# Patient Record
Sex: FEMALE | Race: WHITE | NOT HISPANIC OR LATINO | Employment: FULL TIME | URBAN - METROPOLITAN AREA
[De-identification: names, ages, dates, MRNs, and addresses within clinical notes are randomized per-mention and may not be internally consistent; named-entity substitution may affect disease eponyms.]

---

## 2017-03-26 ENCOUNTER — HOSPITAL ENCOUNTER (EMERGENCY)
Facility: CLINIC | Age: 37
Discharge: HOME | End: 2017-03-26
Attending: FAMILY MEDICINE | Admitting: FAMILY MEDICINE
Payer: COMMERCIAL

## 2017-03-26 ENCOUNTER — APPOINTMENT (OUTPATIENT)
Dept: GENERAL RADIOLOGY | Facility: CLINIC | Age: 37
End: 2017-03-26
Attending: FAMILY MEDICINE
Payer: COMMERCIAL

## 2017-03-26 VITALS
OXYGEN SATURATION: 100 % | HEART RATE: 97 BPM | DIASTOLIC BLOOD PRESSURE: 82 MMHG | RESPIRATION RATE: 18 BRPM | TEMPERATURE: 98.5 F | SYSTOLIC BLOOD PRESSURE: 129 MMHG

## 2017-03-26 DIAGNOSIS — R30.0 DYSURIA: ICD-10-CM

## 2017-03-26 DIAGNOSIS — S92.534A CLOSED NONDISPLACED FRACTURE OF DISTAL PHALANX OF LESSER TOE OF RIGHT FOOT, INITIAL ENCOUNTER: ICD-10-CM

## 2017-03-26 LAB
ALBUMIN UR-MCNC: 30 MG/DL
APPEARANCE UR: ABNORMAL
BILIRUB UR QL STRIP: NEGATIVE
COLOR UR AUTO: YELLOW
GLUCOSE UR STRIP-MCNC: NEGATIVE MG/DL
HCG UR QL: NEGATIVE
HGB UR QL STRIP: NEGATIVE
KETONES UR STRIP-MCNC: 10 MG/DL
LEUKOCYTE ESTERASE UR QL STRIP: NEGATIVE
MUCOUS THREADS #/AREA URNS LPF: PRESENT /LPF
NITRATE UR QL: NEGATIVE
PH UR STRIP: 6 PH (ref 5–7)
RBC #/AREA URNS AUTO: 1 /HPF (ref 0–2)
SP GR UR STRIP: 1.02 (ref 1–1.03)
URN SPEC COLLECT METH UR: ABNORMAL
UROBILINOGEN UR STRIP-MCNC: 2 MG/DL (ref 0–2)
WBC #/AREA URNS AUTO: 3 /HPF (ref 0–2)

## 2017-03-26 PROCEDURE — 99214 OFFICE O/P EST MOD 30 MIN: CPT

## 2017-03-26 PROCEDURE — 99213 OFFICE O/P EST LOW 20 MIN: CPT | Performed by: FAMILY MEDICINE

## 2017-03-26 PROCEDURE — 81025 URINE PREGNANCY TEST: CPT | Performed by: FAMILY MEDICINE

## 2017-03-26 PROCEDURE — 73630 X-RAY EXAM OF FOOT: CPT | Mod: RT

## 2017-03-26 PROCEDURE — 81001 URINALYSIS AUTO W/SCOPE: CPT | Performed by: FAMILY MEDICINE

## 2017-03-26 RX ORDER — VENLAFAXINE HYDROCHLORIDE 150 MG/1
150 CAPSULE, EXTENDED RELEASE ORAL
COMMUNITY
Start: 2016-05-16

## 2017-03-26 RX ORDER — DESOXIMETASONE 0.5 MG/G
GEL TOPICAL
COMMUNITY
Start: 2013-06-19 | End: 2018-11-05

## 2017-03-26 RX ORDER — CHLORAL HYDRATE 500 MG
CAPSULE ORAL
COMMUNITY
Start: 2013-11-05

## 2017-03-26 RX ORDER — LEVOTHYROXINE SODIUM 25 UG/1
25 TABLET ORAL
COMMUNITY
Start: 2016-05-16

## 2017-03-26 RX ORDER — DIPHENOXYLATE HYDROCHLORIDE AND ATROPINE SULFATE 2.5; .025 MG/1; MG/1
1 TABLET ORAL
COMMUNITY
Start: 2014-07-23

## 2017-03-26 RX ORDER — NORGESTIMATE AND ETHINYL ESTRADIOL 7DAYSX3 28
1 KIT ORAL
COMMUNITY
Start: 2017-02-20

## 2017-03-26 RX ORDER — LISINOPRIL AND HYDROCHLOROTHIAZIDE 20; 25 MG/1; MG/1
1 TABLET ORAL
COMMUNITY
Start: 2016-05-16 | End: 2023-02-21

## 2017-03-26 ASSESSMENT — ENCOUNTER SYMPTOMS
GASTROINTESTINAL NEGATIVE: 1
DYSURIA: 1
CARDIOVASCULAR NEGATIVE: 1
JOINT SWELLING: 1
ALLERGIC/IMMUNOLOGIC NEGATIVE: 1
ACTIVITY CHANGE: 0
NEUROLOGICAL NEGATIVE: 1
FREQUENCY: 1
ARTHRALGIAS: 1
FATIGUE: 0
RESPIRATORY NEGATIVE: 1

## 2017-03-26 NOTE — DISCHARGE INSTRUCTIONS
Foot Fracture  You have a broken bone (fracture) in your foot. This will cause pain, swelling, and sometimes bruising. It will take about 4 to 6 weeks to heal. A foot fracture may be treated with a special shoe, splint, cast, or boot.  Home care  Follow these guidelines when caring for yourself at home:    You may be given a splint, cast, shoe, or boot to keep the injured area from moving. Unless you were told otherwise, use crutches or a walker. Don t put weight on the injured foot until your health care provider says you can do so. (You can rent crutches and a walker at many pharmacies and surgical or orthopedic supply stores.) Don t put weight on a splint, or it will break.    Keep your leg elevated to reduce pain and swelling. When sleeping, put a pillow under the injured leg. When sitting, support the injured leg so it is level with your waist. This is very important during the first 2 days (48 hours).    Put an ice pack on the injured area. Do this for 20 minutes every 1 to 2 hours the first day for pain relief. You can make an ice pack by wrapping a plastic bag of ice cubes in a thin towel. As the ice melts, be careful that the splint/cast/boot/shoe doesn t get wet. You can place the ice pack directly over the splint or cast. Unless told otherwise, you can open the boot or shoe to apply the ice pack. Continue using the ice pack 3 to 4 times a day for the next 2 days. Then use the ice pack as needed to ease pain and swelling.    Keep the splint/cast/boot/shoe dry. When bathing, protect it with a large plastic bag, rubber-banded at the top end. If a fiberglass splint or cast or boot gets wet, you can dry it with a hair dryer. Unless told otherwise, you can take off the boot or shoe to bathe.    You may use acetaminophen or ibuprofen to control pain, unless another pain medicine was prescribed. If you have chronic liver or kidney disease, talk with your health care provider before using these medicines. Also  talk with your provider if you ve had a stomach ulcer or GI bleeding.    Don t put creams or objects under the cast if you have itching.  Follow-up care  Follow up with your health care provider within 1 week, or as advised. This is to make sure the bone is healing the way it should. If you were given a splint, it may be changed to a cast or boot at your follow-up visit.  If X-rays were taken, a radiologist will look at them. You will be told of any new findings that may affect your care.  When to seek medical advice  Call your health care provider right away if any of these occur:    The cast cracks    The plaster cast or splint becomes wet or soft    The fiberglass cast or splint stays wet for more than 24 hours    Bad odor from the cast or wound fluid stains the cast    Tightness or pain under the cast or splint gets worse    Toes become swollen, cold, blue, numb, or tingly    You can t move your toes    Skin around cast becomes red    Fever of 101 F (38.3 C) or higher, or as directed by your health care provider    4886-8268 [employer]. [address] [place] [address]. All rights reserved. This information is not intended as a substitute for professional medical care. Always follow your healthcare professional's instructions.

## 2017-03-26 NOTE — ED AVS SNAPSHOT
[facility] Emergency Department    [address] [address]    Phone:  [phone]    Fax:  [phone]                                       Guadalupe Terry   MRN: [id number]    Department:  [facility] Emergency Department   Date of Visit:  3/26/2017           Patient Information     Date Of Birth          1980        Your diagnoses for this visit were:     Closed nondisplaced fracture of distal phalanx of lesser toe of right foot, initial encounter     Dysuria        You were seen by Idania Sawant MD.      Follow-up Information     Follow up with Carmel Armijo MD.    Specialty:  Family Practice    Why:  If symptoms worsen    Contact information:    [facility]  [address] [id number] [phone]          Discharge Instructions         Foot Fracture  You have a broken bone (fracture) in your foot. This will cause pain, swelling, and sometimes bruising. It will take about 4 to 6 weeks to heal. A foot fracture may be treated with a special shoe, splint, cast, or boot.  Home care  Follow these guidelines when caring for yourself at home:    You may be given a splint, cast, shoe, or boot to keep the injured area from moving. Unless you were told otherwise, use crutches or a walker. Don t put weight on the injured foot until your health care provider says you can do so. (You can rent crutches and a walker at many pharmacies and surgical or orthopedic supply stores.) Don t put weight on a splint, or it will break.    Keep your leg elevated to reduce pain and swelling. When sleeping, put a pillow under the injured leg. When sitting, support the injured leg so it is level with your waist. This is very important during the first 2 days (48 hours).    Put an ice pack on the injured area. Do this for 20 minutes every 1 to 2 hours the first day for pain relief. You can make an ice pack by wrapping a plastic bag of ice cubes in a thin towel. As  the ice melts, be careful that the splint/cast/boot/shoe doesn t get wet. You can place the ice pack directly over the splint or cast. Unless told otherwise, you can open the boot or shoe to apply the ice pack. Continue using the ice pack 3 to 4 times a day for the next 2 days. Then use the ice pack as needed to ease pain and swelling.    Keep the splint/cast/boot/shoe dry. When bathing, protect it with a large plastic bag, rubber-banded at the top end. If a fiberglass splint or cast or boot gets wet, you can dry it with a hair dryer. Unless told otherwise, you can take off the boot or shoe to bathe.    You may use acetaminophen or ibuprofen to control pain, unless another pain medicine was prescribed. If you have chronic liver or kidney disease, talk with your health care provider before using these medicines. Also talk with your provider if you ve had a stomach ulcer or GI bleeding.    Don t put creams or objects under the cast if you have itching.  Follow-up care  Follow up with your health care provider within 1 week, or as advised. This is to make sure the bone is healing the way it should. If you were given a splint, it may be changed to a cast or boot at your follow-up visit.  If X-rays were taken, a radiologist will look at them. You will be told of any new findings that may affect your care.  When to seek medical advice  Call your health care provider right away if any of these occur:    The cast cracks    The plaster cast or splint becomes wet or soft    The fiberglass cast or splint stays wet for more than 24 hours    Bad odor from the cast or wound fluid stains the cast    Tightness or pain under the cast or splint gets worse    Toes become swollen, cold, blue, numb, or tingly    You can t move your toes    Skin around cast becomes red    Fever of 101 F (38.3 C) or higher, or as directed by your health care provider    8270-6736 [employer]. [address] [place] [address]. All  rights reserved. This information is not intended as a substitute for professional medical care. Always follow your healthcare professional's instructions.          24 Hour Appointment Hotline       To make an appointment at any [facility], call [phone] ([phone]). If you don't have a family doctor or clinic, we will help you find one. [facility] clinics are conveniently located to serve the needs of you and your family.             Review of your medicines      Our records show that you are taking the medicines listed below. If these are incorrect, please call your family doctor or clinic.        Dose / Directions Last dose taken    desoximetasone 0.05 % Gel   Commonly known as:  TOPICORT        Refills:  0        fish oil-omega-3 fatty acids 1000 MG capsule        Refills:  0        levothyroxine 25 MCG tablet   Commonly known as:  SYNTHROID/LEVOTHROID   Dose:  25 mcg        Take 25 mcg by mouth   Refills:  0        lisinopril-hydrochlorothiazide 20-25 MG per tablet   Commonly known as:  PRINZIDE/ZESTORETIC   Dose:  1 tablet        Take 1 tablet by mouth   Refills:  0        MULTI-VITAMINS Tabs   Dose:  1 tablet        Take 1 tablet by mouth   Refills:  0        norgestim-eth estrad triphasic 0.18/0.215/0.25 MG-35 MCG per tablet   Commonly known as:  ORTHO TRI-CYCLEN, TRI-SPRINTEC   Dose:  1 tablet        Take 1 tablet by mouth   Refills:  0        venlafaxine 150 MG 24 hr capsule   Commonly known as:  EFFEXOR-XR   Dose:  150 mg        Take 150 mg by mouth   Refills:  0        vitamin D3 1000 UNITS Caps   Dose:  1000 Units        Take 1,000 Units by mouth   Refills:  0                Procedures and tests performed during your visit     Foot  XR, G/E 3 views, right    HCG qualitative urine    UA with Microscopic reflex to Culture      Orders Needing Specimen Collection     None      Pending Results     Date and Time Order Name Status Description    3/26/2017 1622 Foot  XR, G/E 3 views, right  Preliminary             Pending Culture Results     No orders found from 3/24/2017 to 3/27/2017.             Test Results from your hospital stay     3/26/2017  5:18 PM - Interface, Flexilab Results      Component Results     Component Value Ref Range & Units Status    Color Urine Yellow  Final    Appearance Urine Slightly Cloudy  Final    Glucose Urine Negative NEG mg/dL Final    Bilirubin Urine Negative NEG Final    Ketones Urine 10 (A) NEG mg/dL Final    Specific Gravity Urine 1.022 1.003 - 1.035 Final    Blood Urine Negative NEG Final    pH Urine 6.0 5.0 - 7.0 pH Final    Protein Albumin Urine 30 (A) NEG mg/dL Final    Urobilinogen mg/dL 2.0 0.0 - 2.0 mg/dL Final    Nitrite Urine Negative NEG Final    Leukocyte Esterase Urine Negative NEG Final    Source Midstream Urine  Final    WBC Urine 3 (H) 0 - 2 /HPF Final    RBC Urine 1 0 - 2 /HPF Final    Mucous Urine Present (A) NEG /LPF Final         3/26/2017  4:41 PM - Interface, Radiant Ib      Narrative     FOOT RIGHT THREE OR MORE VIEWS   3/26/2017 4:32 PM     HISTORY: Pain.    COMPARISON: None.    FINDINGS: Internal fixation hardware right mid foot centered on the  base of the right great toe and including the adjacent medial  cuneiform. Tiny nondisplaced fracture at the base of the fifth  metatarsal noted.        Impression     IMPRESSION:   1. Postoperative changes right midfoot centered on the great toe.  2. Nondisplaced fracture at the base of the right fifth metatarsal.         3/26/2017  4:54 PM - Interface, Flexilab Results      Component Results     Component Value Ref Range & Units Status    HCG Qual Urine Negative NEG Final                Thank you for choosing [facility]       Thank you for choosing [facility] for your care. Our goal is always to provide you with excellent care. Hearing back from our patients is one way we can continue to improve our services. Please take a few minutes to complete the written survey that you may receive in the mail after  "you visit with us. Thank you!        [employer] Information     [facility] lets you send messages to your doctor, view your test results, renew your prescriptions, schedule appointments and more. To sign up, go to [email or web address] . Click on \"Log in\" on the left side of the screen, which will take you to the Welcome page. Then click on \"Sign up Now\" on the right side of the page.     You will be asked to enter the access code listed below, as well as some personal information. Please follow the directions to create your username and password.     Your access code is: [email or web address]  Expires: 2017  5:30 PM     Your access code will  in 90 days. If you need help or a new code, please call your [facility] clinic or [phone].        Care EveryWhere ID     This is your Care EveryWhere ID. This could be used by other organizations to access your [facility] medical records  [id number]        After Visit Summary       This is your record. Keep this with you and show to your community pharmacist(s) and doctor(s) at your next visit.                  "

## 2017-03-26 NOTE — ED AVS SNAPSHOT
[facility] Emergency Department    [address] [address]    Phone:  [phone]    Fax:  [phone]                                       Guadalupe Terry   MRN: [id number]    Department:  [facility] Emergency Department   Date of Visit:  3/26/2017           After Visit Summary Signature Page     I have received my discharge instructions, and my questions have been answered. I have discussed any challenges I see with this plan with the nurse or doctor.    ..........................................................................................................................................  Patient/Patient Representative Signature      ..........................................................................................................................................  Patient Representative Print Name and Relationship to Patient    ..................................................               ................................................  Date                                            Time    ..........................................................................................................................................  Reviewed by Signature/Title    ...................................................              ..............................................  Date                                                            Time

## 2017-03-26 NOTE — ED PROVIDER NOTES
History     Chief Complaint   Patient presents with     Foot Pain     right, no known injury, recent hx of fracture in that foot per patient     Rule out Urinary Tract Infection     burning     HPI  Guadalupe Terry is a 36 year old female who presents with right foot pain. She reports that she woke up this morning with pain in her right foot. Of note she had fracture in the same foot about 9 months ago and this was surgically corrected. Patient states that she did not have any trauma and she does not know what could have caused the fracture. She denies any swelling but walking makes the pain worse. Patient is otherwise quite healthy.   Patient is also having some bladder symptoms . She reports that she has some frequency and also some urgency . No fevers or chills.     I have reviewed the Medications, Allergies, Past Medical and Surgical History, and Social History in the Epic system.    Review of Systems   Constitutional: Negative for activity change and fatigue.   HENT: Negative for dental problem.    Respiratory: Negative.    Cardiovascular: Negative.    Gastrointestinal: Negative.    Genitourinary: Positive for dysuria, frequency and urgency.   Musculoskeletal: Positive for arthralgias and joint swelling.   Allergic/Immunologic: Negative.    Neurological: Negative.        Physical Exam   BP: 129/82  Pulse: 97  Temp: 98.5  F (36.9  C)  Resp: 18  SpO2: 100 %  Physical Exam   Constitutional: She appears well-developed and well-nourished.   HENT:   Head: Normocephalic and atraumatic.   Right Ear: External ear normal.   Left Ear: External ear normal.   Mouth/Throat: Oropharynx is clear and moist.   Eyes: Conjunctivae are normal. Pupils are equal, round, and reactive to light.   Neck: Normal range of motion. Neck supple.   Cardiovascular: Normal rate, regular rhythm and normal heart sounds.  Exam reveals no gallop and no friction rub.    No murmur heard.  Pulmonary/Chest: Effort normal and breath sounds normal. No  respiratory distress. She has no wheezes. She has no rales.   Abdominal: Soft. Bowel sounds are normal. She exhibits no distension and no mass. There is no tenderness. There is no rebound and no guarding.   Musculoskeletal: She exhibits tenderness.        Right foot: There is decreased range of motion, tenderness and swelling.       ED Course     ED Course     Procedures                 Labs Ordered and Resulted from Time of ED Arrival Up to the Time of Departure from the ED   ROUTINE UA WITH MICROSCOPIC REFLEX TO CULTURE - Abnormal; Notable for the following:        Result Value    Ketones Urine 10 (*)     Protein Albumin Urine 30 (*)     WBC Urine 3 (*)     Mucous Urine Present (*)     All other components within normal limits   HCG QUALITATIVE URINE       Assessments & Plan (with Medical Decision Making)     I have reviewed the nursing notes.    I have reviewed the findings, diagnosis, plan and need for follow up with the patient.    New Prescriptions    No medications on file       Final diagnoses:   Closed nondisplaced fracture of distal phalanx of lesser toe of right foot, initial encounter   Dysuria   Recommended that she sees her orthopedic doctor in the next few days.   Urine did not reveal joycelyn UTI . Will culture urine and contact patient with results if positive.   3/26/2017   [facility] EMERGENCY DEPARTMENT     Idania Sawant MD  03/26/17 9518

## 2018-05-28 ENCOUNTER — HOSPITAL ENCOUNTER (EMERGENCY)
Facility: CLINIC | Age: 38
Discharge: HOME | End: 2018-05-29
Attending: EMERGENCY MEDICINE | Admitting: EMERGENCY MEDICINE
Payer: COMMERCIAL

## 2018-05-28 DIAGNOSIS — R79.89 ELEVATED LFTS: ICD-10-CM

## 2018-05-28 DIAGNOSIS — F10.230 ALCOHOL DEPENDENCE WITH UNCOMPLICATED WITHDRAWAL (H): ICD-10-CM

## 2018-05-28 LAB
ALBUMIN SERPL-MCNC: 4.5 G/DL (ref 3.4–5)
ALP SERPL-CCNC: 56 U/L (ref 40–150)
ALT SERPL W P-5'-P-CCNC: 146 U/L (ref 0–50)
ANION GAP SERPL CALCULATED.3IONS-SCNC: 12 MMOL/L (ref 3–14)
AST SERPL W P-5'-P-CCNC: 126 U/L (ref 0–45)
BASOPHILS # BLD AUTO: 0.1 10E9/L (ref 0–0.2)
BASOPHILS NFR BLD AUTO: 1.4 %
BILIRUB SERPL-MCNC: 0.6 MG/DL (ref 0.2–1.3)
BUN SERPL-MCNC: 7 MG/DL (ref 7–30)
CALCIUM SERPL-MCNC: 9.2 MG/DL (ref 8.5–10.1)
CHLORIDE SERPL-SCNC: 99 MMOL/L (ref 94–109)
CO2 SERPL-SCNC: 25 MMOL/L (ref 20–32)
CREAT SERPL-MCNC: 0.52 MG/DL (ref 0.52–1.04)
DIFFERENTIAL METHOD BLD: ABNORMAL
EOSINOPHIL # BLD AUTO: 0.2 10E9/L (ref 0–0.7)
EOSINOPHIL NFR BLD AUTO: 2.4 %
ERYTHROCYTE [DISTWIDTH] IN BLOOD BY AUTOMATED COUNT: 11.4 % (ref 10–15)
ETHANOL SERPL-MCNC: 0.02 G/DL
GFR SERPL CREATININE-BSD FRML MDRD: >90 ML/MIN/1.7M2
GLUCOSE SERPL-MCNC: 101 MG/DL (ref 70–99)
HCT VFR BLD AUTO: 39.3 % (ref 35–47)
HGB BLD-MCNC: 13.5 G/DL (ref 11.7–15.7)
IMM GRANULOCYTES # BLD: 0 10E9/L (ref 0–0.4)
IMM GRANULOCYTES NFR BLD: 0.2 %
LYMPHOCYTES # BLD AUTO: 2.4 10E9/L (ref 0.8–5.3)
LYMPHOCYTES NFR BLD AUTO: 38.9 %
MAGNESIUM SERPL-MCNC: 1.7 MG/DL (ref 1.6–2.3)
MCH RBC QN AUTO: 34.7 PG (ref 26.5–33)
MCHC RBC AUTO-ENTMCNC: 34.4 G/DL (ref 31.5–36.5)
MCV RBC AUTO: 101 FL (ref 78–100)
MONOCYTES # BLD AUTO: 0.9 10E9/L (ref 0–1.3)
MONOCYTES NFR BLD AUTO: 13.6 %
NEUTROPHILS # BLD AUTO: 2.7 10E9/L (ref 1.6–8.3)
NEUTROPHILS NFR BLD AUTO: 43.5 %
PLATELET # BLD AUTO: 238 10E9/L (ref 150–450)
POTASSIUM SERPL-SCNC: 3.7 MMOL/L (ref 3.4–5.3)
PROT SERPL-MCNC: 8.9 G/DL (ref 6.8–8.8)
RBC # BLD AUTO: 3.89 10E12/L (ref 3.8–5.2)
SODIUM SERPL-SCNC: 136 MMOL/L (ref 133–144)
WBC # BLD AUTO: 6.3 10E9/L (ref 4–11)

## 2018-05-28 PROCEDURE — 80320 DRUG SCREEN QUANTALCOHOLS: CPT | Performed by: EMERGENCY MEDICINE

## 2018-05-28 PROCEDURE — 96361 HYDRATE IV INFUSION ADD-ON: CPT | Mod: 59

## 2018-05-28 PROCEDURE — 25000128 H RX IP 250 OP 636: Performed by: EMERGENCY MEDICINE

## 2018-05-28 PROCEDURE — 99284 EMERGENCY DEPT VISIT MOD MDM: CPT | Mod: 25

## 2018-05-28 PROCEDURE — 96374 THER/PROPH/DIAG INJ IV PUSH: CPT

## 2018-05-28 PROCEDURE — 83735 ASSAY OF MAGNESIUM: CPT | Performed by: EMERGENCY MEDICINE

## 2018-05-28 PROCEDURE — 25000132 ZZH RX MED GY IP 250 OP 250 PS 637: Performed by: EMERGENCY MEDICINE

## 2018-05-28 PROCEDURE — 80053 COMPREHEN METABOLIC PANEL: CPT | Performed by: EMERGENCY MEDICINE

## 2018-05-28 PROCEDURE — 25000128 H RX IP 250 OP 636

## 2018-05-28 PROCEDURE — 85025 COMPLETE CBC W/AUTO DIFF WBC: CPT | Performed by: EMERGENCY MEDICINE

## 2018-05-28 RX ORDER — DIAZEPAM 5 MG
5 TABLET ORAL ONCE
Status: COMPLETED | OUTPATIENT
Start: 2018-05-28 | End: 2018-05-28

## 2018-05-28 RX ORDER — ONDANSETRON 2 MG/ML
INJECTION INTRAMUSCULAR; INTRAVENOUS
Status: COMPLETED
Start: 2018-05-28 | End: 2018-05-28

## 2018-05-28 RX ORDER — ONDANSETRON 2 MG/ML
4 INJECTION INTRAMUSCULAR; INTRAVENOUS ONCE
Status: COMPLETED | OUTPATIENT
Start: 2018-05-28 | End: 2018-05-28

## 2018-05-28 RX ADMIN — DIAZEPAM 5 MG: 5 TABLET ORAL at 23:46

## 2018-05-28 RX ADMIN — ONDANSETRON 4 MG: 2 INJECTION INTRAMUSCULAR; INTRAVENOUS at 23:05

## 2018-05-28 RX ADMIN — SODIUM CHLORIDE, POTASSIUM CHLORIDE, SODIUM LACTATE AND CALCIUM CHLORIDE 1000 ML: 600; 310; 30; 20 INJECTION, SOLUTION INTRAVENOUS at 23:05

## 2018-05-28 NOTE — ED AVS SNAPSHOT
[facility] Emergency Department    [address] [address]    Phone:  [phone]    Fax:  [phone]                                       Guadalupe Terry   MRN: [id number]    Department:  [facility] Emergency Department   Date of Visit:  5/28/2018           After Visit Summary Signature Page     I have received my discharge instructions, and my questions have been answered. I have discussed any challenges I see with this plan with the nurse or doctor.    ..........................................................................................................................................  Patient/Patient Representative Signature      ..........................................................................................................................................  Patient Representative Print Name and Relationship to Patient    ..................................................               ................................................  Date                                            Time    ..........................................................................................................................................  Reviewed by Signature/Title    ...................................................              ..............................................  Date                                                            Time

## 2018-05-29 VITALS
HEART RATE: 133 BPM | SYSTOLIC BLOOD PRESSURE: 143 MMHG | DIASTOLIC BLOOD PRESSURE: 87 MMHG | WEIGHT: 150 LBS | OXYGEN SATURATION: 100 % | HEIGHT: 66 IN | RESPIRATION RATE: 16 BRPM | TEMPERATURE: 99.3 F | BODY MASS INDEX: 24.11 KG/M2

## 2018-05-29 LAB
ALBUMIN UR-MCNC: NEGATIVE MG/DL
APPEARANCE UR: CLEAR
BILIRUB UR QL STRIP: NEGATIVE
COLOR UR AUTO: YELLOW
GLUCOSE UR STRIP-MCNC: NEGATIVE MG/DL
HCG UR QL: NEGATIVE
HGB UR QL STRIP: NEGATIVE
KETONES UR STRIP-MCNC: NEGATIVE MG/DL
LEUKOCYTE ESTERASE UR QL STRIP: NEGATIVE
NITRATE UR QL: NEGATIVE
PH UR STRIP: 9 PH (ref 5–7)
RBC #/AREA URNS AUTO: 1 /HPF (ref 0–2)
SOURCE: ABNORMAL
SP GR UR STRIP: 1.01 (ref 1–1.03)
SQUAMOUS #/AREA URNS AUTO: <1 /HPF (ref 0–1)
UROBILINOGEN UR STRIP-MCNC: 0 MG/DL (ref 0–2)
WBC #/AREA URNS AUTO: 6 /HPF (ref 0–5)

## 2018-05-29 PROCEDURE — 81001 URINALYSIS AUTO W/SCOPE: CPT | Performed by: EMERGENCY MEDICINE

## 2018-05-29 PROCEDURE — 81025 URINE PREGNANCY TEST: CPT | Performed by: EMERGENCY MEDICINE

## 2018-05-29 RX ORDER — DIAZEPAM 5 MG
2.5-5 TABLET ORAL EVERY 6 HOURS PRN
Qty: 10 TABLET | Refills: 0 | Status: SHIPPED | OUTPATIENT
Start: 2018-05-29 | End: 2018-11-05

## 2018-05-29 RX ORDER — ONDANSETRON 4 MG/1
4 TABLET, ORALLY DISINTEGRATING ORAL EVERY 8 HOURS PRN
Qty: 10 TABLET | Refills: 0 | Status: SHIPPED | OUTPATIENT
Start: 2018-05-29 | End: 2018-06-01

## 2018-05-29 RX ORDER — DIAZEPAM 5 MG
2.5-5 TABLET ORAL EVERY 6 HOURS PRN
Qty: 10 TABLET | Refills: 0 | Status: SHIPPED | OUTPATIENT
Start: 2018-05-29 | End: 2018-05-29

## 2018-05-29 RX ORDER — DIAZEPAM 5 MG
5-10 TABLET ORAL
Status: DISCONTINUED | OUTPATIENT
Start: 2018-05-29 | End: 2018-05-29 | Stop reason: HOSPADM

## 2018-05-29 ASSESSMENT — ENCOUNTER SYMPTOMS
NAUSEA: 1
HEADACHES: 0
APPETITE CHANGE: 0
CHILLS: 0
WEAKNESS: 0
FATIGUE: 0
BACK PAIN: 0
CHEST TIGHTNESS: 0
NUMBNESS: 0
DYSURIA: 0
VOMITING: 0
LIGHT-HEADEDNESS: 0
FEVER: 0
SHORTNESS OF BREATH: 0
ABDOMINAL PAIN: 0

## 2018-05-29 NOTE — ED PROVIDER NOTES
"  History     Chief Complaint   Patient presents with     Alcohol Problem     etoh withdrawal, shaking, soa, racing heart, nauseated     HPI  Guadalupe Terry is a 37 year old female with a long-standing history of alcohol abuse who presents for evaluation of withdrawal symptoms and a request to get assistance in sobering up.  Patient reports she has been a regular alcoholic over the last 7 years drinking heavily every night.  Over the past 3 nights she reports she \"maintained a steady buzz\" as she was off for the holiday.  Patient reports her last drink was around 4 AM this morning.  Patient woke up and was highly shaky but determined that she needs to stop drinking.  Patient reports she has stopped for brief periods times in the past and become very shaky, nauseated, and had severe insomnia.  She denies ever having a seizure.  Does not have any recent cough, colds, chest pain, or difficulty breathing.  Had some mild nausea earlier but resolved with Zofran given after triage.  Denies any fevers.    Problem List:    There are no active problems to display for this patient.       Past Medical History:    No past medical history on file.    Past Surgical History:    No past surgical history on file.    Family History:    No family history on file.    Social History:  Marital Status:  Single [1]  Social History   Substance Use Topics     Smoking status: Current Every Day Smoker     Packs/day: 1.00     Smokeless tobacco: Not on file     Alcohol use Yes      Comment: moderate        Medications:      diazepam (VALIUM) 5 MG tablet   ondansetron (ZOFRAN ODT) 4 MG ODT tab   Cholecalciferol (VITAMIN D3) 1000 UNITS CAPS   desoximetasone (TOPICORT) 0.05 % GEL   fish oil-omega-3 fatty acids 1000 MG capsule   levothyroxine (SYNTHROID/LEVOTHROID) 25 MCG tablet   lisinopril-hydrochlorothiazide (PRINZIDE/ZESTORETIC) 20-25 MG per tablet   Multiple Vitamin (MULTI-VITAMINS) TABS   norgestim-eth estrad triphasic (ORTHO TRI-CYCLEN, " "TRI-SPRINTEC) 0.18/0.215/0.25 MG-35 MCG per tablet   venlafaxine (EFFEXOR-XR) 150 MG 24 hr capsule         Review of Systems   Constitutional: Negative for appetite change, chills, fatigue and fever.   HENT: Negative for congestion.    Respiratory: Negative for chest tightness and shortness of breath.    Cardiovascular: Negative for chest pain.   Gastrointestinal: Positive for nausea. Negative for abdominal pain and vomiting.   Genitourinary: Negative for dysuria.   Musculoskeletal: Negative for back pain.   Skin: Negative for rash.   Neurological: Negative for weakness, light-headedness, numbness and headaches.        Tremor     All other systems reviewed and are negative.      Physical Exam   BP: (!) 157/102  Pulse: 133  Heart Rate: 89  Temp: 99.3  F (37.4  C)  Resp: 16  Height: 167.6 cm (5' 6\")  Weight: 68 kg (150 lb)  SpO2: 98 %      Physical Exam   Constitutional: She is oriented to person, place, and time. She appears well-developed and well-nourished.   Anxious and tremulous   Eyes: Conjunctivae are normal.   Cardiovascular: Regular rhythm.  Tachycardia present.    Pulmonary/Chest: Effort normal and breath sounds normal.   Abdominal: Soft. There is no tenderness.   Musculoskeletal: Normal range of motion.   Neurological: She is alert and oriented to person, place, and time.   Resting tremor present   Skin: Skin is warm and dry.   Psychiatric: Her mood appears anxious.   Nursing note and vitals reviewed.      ED Course     ED Course     Procedures               Critical Care time:  none               Results for orders placed or performed during the hospital encounter of 05/28/18 (from the past 24 hour(s))   CBC with platelets, differential   Result Value Ref Range    WBC 6.3 4.0 - 11.0 10e9/L    RBC Count 3.89 3.8 - 5.2 10e12/L    Hemoglobin 13.5 11.7 - 15.7 g/dL    Hematocrit 39.3 35.0 - 47.0 %     (H) 78 - 100 fl    MCH 34.7 (H) 26.5 - 33.0 pg    MCHC 34.4 31.5 - 36.5 g/dL    RDW 11.4 10.0 - 15.0 % "    Platelet Count 238 150 - 450 10e9/L    Diff Method Automated Method     % Neutrophils 43.5 %    % Lymphocytes 38.9 %    % Monocytes 13.6 %    % Eosinophils 2.4 %    % Basophils 1.4 %    % Immature Granulocytes 0.2 %    Absolute Neutrophil 2.7 1.6 - 8.3 10e9/L    Absolute Lymphocytes 2.4 0.8 - 5.3 10e9/L    Absolute Monocytes 0.9 0.0 - 1.3 10e9/L    Absolute Eosinophils 0.2 0.0 - 0.7 10e9/L    Absolute Basophils 0.1 0.0 - 0.2 10e9/L    Abs Immature Granulocytes 0.0 0 - 0.4 10e9/L   Comprehensive metabolic panel   Result Value Ref Range    Sodium 136 133 - 144 mmol/L    Potassium 3.7 3.4 - 5.3 mmol/L    Chloride 99 94 - 109 mmol/L    Carbon Dioxide 25 20 - 32 mmol/L    Anion Gap 12 3 - 14 mmol/L    Glucose 101 (H) 70 - 99 mg/dL    Urea Nitrogen 7 7 - 30 mg/dL    Creatinine 0.52 0.52 - 1.04 mg/dL    GFR Estimate >90 >60 mL/min/1.7m2    GFR Estimate If Black >90 >60 mL/min/1.7m2    Calcium 9.2 8.5 - 10.1 mg/dL    Bilirubin Total 0.6 0.2 - 1.3 mg/dL    Albumin 4.5 3.4 - 5.0 g/dL    Protein Total 8.9 (H) 6.8 - 8.8 g/dL    Alkaline Phosphatase 56 40 - 150 U/L     (H) 0 - 50 U/L     (H) 0 - 45 U/L   Magnesium   Result Value Ref Range    Magnesium 1.7 1.6 - 2.3 mg/dL   Alcohol level blood   Result Value Ref Range    Ethanol g/dL 0.02 (H) <0.01 g/dL   UA with Microscopic   Result Value Ref Range    Color Urine Yellow     Appearance Urine Clear     Glucose Urine Negative NEG^Negative mg/dL    Bilirubin Urine Negative NEG^Negative    Ketones Urine Negative NEG^Negative mg/dL    Specific Gravity Urine 1.009 1.003 - 1.035    Blood Urine Negative NEG^Negative    pH Urine 9.0 (H) 5.0 - 7.0 pH    Protein Albumin Urine Negative NEG^Negative mg/dL    Urobilinogen mg/dL 0.0 0.0 - 2.0 mg/dL    Nitrite Urine Negative NEG^Negative    Leukocyte Esterase Urine Negative NEG^Negative    Source Midstream Urine     WBC Urine 6 (H) 0 - 5 /HPF    RBC Urine 1 0 - 2 /HPF    Squamous Epithelial /HPF Urine <1 0 - 1 /HPF   HCG  qualitative urine (UPT)   Result Value Ref Range    HCG Qual Urine Negative NEG^Negative       Medications   ondansetron (ZOFRAN) injection 4 mg (4 mg Intravenous Given 5/28/18 2753)   lactated ringers BOLUS 1,000 mL (0 mLs Intravenous Stopped 5/29/18 0113)   diazepam (VALIUM) tablet 5 mg (5 mg Oral Given 5/28/18 5027)     1:13 AM: PT re-assessed: Tachycardia, hypertension, and tremulousness resolved.  Discussed options for detox versus home treatment as an outpatient.  Patient is not interested in detox but is very interested in outpatient resources to help her with her sobriety.    Assessments & Plan (with Medical Decision Making)  37-year-old female with history of alcoholism presented for evaluation of tremulousness and anxiety with racing heart, nausea, and shakiness.  Last drink 4 AM yesterday.  Patient tachycardic, hypertensive, tremulous consistent with alcohol withdrawal.  Symptoms normalized after single oral dose of 5 mg diazepam.  Offered transfer to detox the patient prefers to manage at home.  Multiple resources provided to the patient for assistance with her rehab including Alcoholics Anonymous, recommended talking to her insurance company about psychiatric counseling/cognitive behavioral therapy to help with sobriety, and encouraged her to recruit her family and friends to help her maintain sobriety.  Patient significant other was present at bedside and I counseled him to also avoid drinking has any alcohol consumption at this stage is likely to increase the likelihood of the patient returning to drinking.  Patient was provided a short-term course of oral diazepam to help manage her symptoms of withdrawal.  Advised starting with 5 mg every 6-8 hours and weaning down if possible.  Patient is however, less than 24 hours from her last drink so has a high likelihood of clinical worsening in the next 2-3 days.   and patient made aware of this possibility.  Advised that if symptoms worsen, she can  try taking 2 pills for a total of 10 mg at a time but if her symptoms return with severity despite this, she needs to return to the emergency department for repeat evaluation.  Patient and  expressed understanding and gratitude and agreed to follow-up as needed     I have reviewed the nursing notes.    I have reviewed the findings, diagnosis, plan and need for follow up with the patient.       Discharge Medication List as of 5/29/2018  1:13 AM      START taking these medications    Details   diazepam (VALIUM) 5 MG tablet Take 0.5-1 tablets (2.5-5 mg) by mouth every 6 hours as needed (Shakiness, anxiety, or difficulty with sleep), Disp-10 tablet, R-0, Local Print             Final diagnoses:   Alcohol dependence with uncomplicated withdrawal (H)   Elevated LFTs       5/28/2018   [facility] EMERGENCY DEPARTMENT     Fields, Teja Bhatt MD  05/29/18 7582

## 2018-07-24 ENCOUNTER — HOSPITAL ENCOUNTER (OUTPATIENT)
Dept: BEHAVIORAL HEALTH | Facility: CLINIC | Age: 38
Discharge: HOME | End: 2018-07-24
Attending: SOCIAL WORKER | Admitting: SOCIAL WORKER
Payer: COMMERCIAL

## 2018-07-24 VITALS — HEIGHT: 66 IN | WEIGHT: 160 LBS | BODY MASS INDEX: 25.71 KG/M2

## 2018-07-24 PROCEDURE — H0001 ALCOHOL AND/OR DRUG ASSESS: HCPCS

## 2018-07-24 ASSESSMENT — ANXIETY QUESTIONNAIRES
2. NOT BEING ABLE TO STOP OR CONTROL WORRYING: NOT AT ALL
5. BEING SO RESTLESS THAT IT IS HARD TO SIT STILL: NOT AT ALL
7. FEELING AFRAID AS IF SOMETHING AWFUL MIGHT HAPPEN: NOT AT ALL
GAD7 TOTAL SCORE: 0
6. BECOMING EASILY ANNOYED OR IRRITABLE: NOT AT ALL
IF YOU CHECKED OFF ANY PROBLEMS ON THIS QUESTIONNAIRE, HOW DIFFICULT HAVE THESE PROBLEMS MADE IT FOR YOU TO DO YOUR WORK, TAKE CARE OF THINGS AT HOME, OR GET ALONG WITH OTHER PEOPLE: NOT DIFFICULT AT ALL
1. FEELING NERVOUS, ANXIOUS, OR ON EDGE: NOT AT ALL
3. WORRYING TOO MUCH ABOUT DIFFERENT THINGS: NOT AT ALL

## 2018-07-24 ASSESSMENT — PATIENT HEALTH QUESTIONNAIRE - PHQ9: 5. POOR APPETITE OR OVEREATING: NOT AT ALL

## 2018-07-24 ASSESSMENT — PAIN SCALES - GENERAL: PAINLEVEL: NO PAIN (0)

## 2018-07-24 NOTE — PROGRESS NOTES
Outcome of vulnerable Adult Assessment for Outpatients:    1.  Do you have a physical, emotional or mental infirmity or dysfunction?       No    2.  Does this issue impair your ability to provide for your own care without help, including providing yourself with food, shelter, clothing, healthcare or supervision?       No      3.  Because of this issue, I need assistance to protect myself from maltreatment by others.      No    Based on the above information:    This person is not a functional Vulnerable Adult according to [place] Statute 626.5572 subdivision 21.

## 2018-07-24 NOTE — PROGRESS NOTES
COMPREHENSIVE ASSESSMENT    Background Information   Original Date of Assessment:  7/24/2018-UPDATE on 7/31/18   Referral Source:  Self   Evaluation Counselor:  INOCENCIO Barba LAD Counselor Telephone #:   [phone] Assessment Site:  [facility]   Patient Name:   Guadalupe Terry YOB: 1980 Age:  38 year old Gender:  female Medical Record #:  [id number]   Patient's Primary Language:  English Do you need assistance with reading, writing or hearing?  Do you need a ?  No   Current Address:  [address] [place] [id number]   Patient Phone Number:  [phone] (home)    Patient Mobile Number:    Telephone Information:   Mobile [phone]      Patient E-mail Address:  [email or web address]     Which pronouns do you prefer to be referred by?  She/Her     With which race do you identify?  White     This patient was seen for a face to face assessment on 7/24/2018:  Yes       Initial Screening Questions     1. Are you currently having severe withdrawal symptoms that are putting yourself or others in danger?  No    2. Are you currently having severe medical problems that require immediate attention?  No    3. Are you currently having severe emotional or behavioral problems that are putting yourself or others at risk of harm?  No    4. Do you have sufficient reading skills that will enable you to understand written materials, including the program rules and client rights materials?  Yes    Precipitating Event Summary     What are the circumstances or events that have led up to you participating in this evaluation today?    Per EMR intake:  S:  7/16/18 Client called for a Chemical Dependency evaluation  B:  Client reported having a hx of Alcohol use. She denies other drug use,   Denies a hx of alcohol related seizures, denies legal issues, denies   previous CD TX.  A:  CD Eval  Hx of Depression/Anxiety, prescribed Effexor.   R:  Scheduled for  "7/24/18 @ [facility].    Per clt:here because my parents mainly, dad recommended I get an assessment, he researched and found this place and it is convenient.     Have you participated in prior substance use disorder evaluations?     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 Never been problem since.     Comprehensive Substance Use History    X = Primary Drug Used Age of First Use    Pattern of Substance Use   Make sure to include period of heaviest use in life and a use history within the past year if applicable.  Please include a pattern with a specific range of amounts used and a frequency of use:  (DSM-5: Sx #3) Date of last use  Quantity of last use if within the past 30 days Withdrawal Potential?  Screen for need of IP detox or other medical intervention Method of use  (Oral, smoked, snorted, IV, etc)   X Alcohol       16 Per EMR ED admission note on 5/28/18:  \"  Patient presents with     Alcohol Problem       etoh withdrawal, shaking, soa, racing heart, nauseated      HPI  Guadalupe Terry is a 37 year old female with a long-standing history of alcohol abuse who presents for evaluation of withdrawal symptoms and a request to get assistance in sobering up.  Patient reports she has been a regular alcoholic over the last 7 years drinking heavily every night.  Over the past 3 nights she reports she \"maintained a steady buzz\" as she was off for the holiday.  Patient reports her last drink was around 4 AM this morning.  Patient woke up and was highly shaky but determined that she needs to stop drinking.  Patient reports she has stopped for brief periods times in the past and become very shaky, nauseated, and had severe insomnia.   Ethanol g/dL 0.02 (H) <0.01 g/dL   \".    Did not start drinking everyday on regular basis until 7 years ago. Never had alcohol in house prior to 7 years ago. Since seven years ago, daily, first five years, " work, come home, have some cocktails and then go to bed, was functional though then, past year is when it has gotten bad. Daily, recently, as bad as probably consume, vodka is choice, one of large bottles of vodka in a 24 hour period at the worst. If I can keep it to evening. Worst it is has been last 3-4 months, come home on lunch break, not sure what would make me do it I would make a cocktail, some mornings wake up and make a drink. When doing that, dealing with, do that for three days, and then would stop, then had to go to ED for withdrawal. Last one being two weeks ago for ED admission. Since then I quit totally, for probably few days, since then it has just been at night. Since then no major binge episodes.  My fibryon and parents do not know that I have drank since ED admission. Hiding it. Not at point where I would go through withdrawal at this point. 90% habit and then the rest is insomnia. Tatiana does not have problem with alcohol. He will just drink with me. He can take it or leave it though. Quit for almost a month, longest time in last 7 years. After first time going to ED, 4 months ago. Had a seizure. First time it ever happened.  7/23/18, 2 cocktail, evening no oral    Marijuana/  Hashish     N/A        Cocaine/Crack       N/A        Meth/  Amphetamines       24 Hx of it denied using since 12 years ago.Only did it for year and half. Went to tx and it was done. 12 years ago no smoke    Heroin       N/A        Other Opiates/  Synthetics     N/A        Inhalants      N/A        Benzodiazepines       N/A        Hallucinogens       N/A        Barbiturates/  Sedatives/  Hypnotics   N/A        Over-the-Counter Drugs     N/A        Other       N/A        Nicotine       16 E-cig, quit smoking about a year and half ago. On and off since age 16. Quit multiple times and started. Long periods of quitting and then starting again. E-cig- in my hand almost all the time. At work cannot do it, breaks, and sitting  watching TV.  18,20 puffs,afternoon no smoke     DIMENSION I - Acute Intoxication / Withdrawal Potential     1. Do you use greater amounts of alcohol/other drugs to feel intoxicated, use greater amounts to achieve the desired effect, or use the same amount and get less of an effect?  (DSM-5: Sx #10)     Yes, explain: alcohol     2. Have you ever had an inpatient detoxification admission?  (DSM-5: Sx #11)    Never admitted to detox, only went to ED, never stayed overnight.     3. Withdrawal Symptoms:  Within the past year: Within the past 30 days:   Sweating (Rapid Pulse)  Shaky / Jittery / Tremors  Unable to Sleep  Headache  Nausea / Vomiting  Seizures   Sweating (Rapid Pulse)  Shaky / Jittery / Tremors  Unable to Sleep  Headache       4. Is the patient currently exhibiting symptoms of withdrawal?  (DSM-5: Sx #11)    No    5. Based on the above information, does treatment for withdrawal symptoms appear to be a need at this time?  (DSM-5: Sx #11)    No    Dimension I Ratings Summary   Acute intoxication/Withdrawal potential - The placing authority must use the criteria in Dimension I to determine a client s acute intoxication and withdrawal potential.    RISK DESCRIPTIONS - Severity ratin Client displays full functioning with good ability to tolerate and cope with withdrawal discomfort. No signs or symptoms of intoxication or withdrawal or resolving signs or symptoms.    REASONS SEVERITY WAS ASSIGNED (What about the amount of the person s use and date of most recent use and history of withdrawal problems suggests the potential of withdrawal symptoms requiring professional assistance?)     Clt reported the reason for the assessment was because her parents wanted her to. Clt reported last use date of alcohol as 18 and nicotine as 18. Clt denied past admission to detox. Clt reported withdrawal symptoms.      DIMENSION II - Biomedical Complications and Conditions     1. Do you have any current  health/medical conditions?(Include any infectious diseases, allergies, chronic or acute pain, history of chronic conditions)       Yes, per clt:high blood pressure, anxiety, depression, thyroid- hypotension.       Per EMR:  Past Medical History:   Diagnosis Date     Depressive disorder        2. Do you have a health care provider? When was your most recent appointment? What concerns were identified?     Per clt: [place], primary doctor is Dr. Sears.     3. If yes indicated by answers to items 1 or 2: How do you deal with these concerns? Is that working for you? If you are not receiving care for this problem, why not?      medications    4. Please list all of the patient's current medication(s) including health management, psychotropic, pain management, over-the-counter and/or herbal supplements:     Per clt: levothyroxine, 25mg, 3 years, daily, [place]  Lisinopril 25mg, for three years, one daily, [place]  Birth control  Venlafaxine 150mg, daily, for 10 years, [place]    Per EMR hx:  Current Outpatient Prescriptions   Medication     Cholecalciferol (VITAMIN D3) 1000 UNITS CAPS     desoximetasone (TOPICORT) 0.05 % GEL     diazepam (VALIUM) 5 MG tablet     fish oil-omega-3 fatty acids 1000 MG capsule     levothyroxine (SYNTHROID/LEVOTHROID) 25 MCG tablet     lisinopril-hydrochlorothiazide (PRINZIDE/ZESTORETIC) 20-25 MG per tablet     Multiple Vitamin (MULTI-VITAMINS) TABS     norgestim-eth estrad triphasic (ORTHO TRI-CYCLEN, TRI-SPRINTEC) 0.18/0.215/0.25 MG-35 MCG per tablet     venlafaxine (EFFEXOR-XR) 150 MG 24 hr capsule     No current facility-administered medications for this encounter.        5. When did you last take your medication?     Today     6. Do you currently self-administer your medications?      Yes    7. Do you follow current medical recommendations/take medications as prescribed?     Yes    8. Has a health care provider/healer ever recommended that you reduce or quit alcohol/drug use?  (DSM-5: Sx  #9)    Yes    9. Are you pregnant?     No    10. Have you had any injuries, assaults/violence towards you, accidents, health related issues, overdose(s) or hospitalizations related to your use of alcohol or other drugs:  (DSM-5: Sx #8 & #9)    Yes, explain: per clt: 1x [place], for no days, alcohol withdrawal. Per EMR this was on 18. 2x in [facility] ER, ER 3x in past 3-4 months. ED admission about two weeks ago.     11. Have you engaged in any risk-taking behavior that would put you at risk for exposure to blood-borne or sexually transmitted diseases?    No    12. Are you on a special diet?    No    13. Do you have any concerns regarding your nutritional status?    No    14. Have you had any appetite changes in the last 3 months?    No    15. Have you had weight loss or weight gain of more than 10 lbs in the last 3 months?   If patient gained or lost more than 10 lbs, then refer to program RN / attending Physician for assessment.    No    16. Was the patient informed of BMI?  No    Above,  Referral to primary care physician    17. Do you have any dental problems?    No    18. Do you have any specific physical needs or disabilities that would need accommodation in a treatment program?     No    Dimension II Ratings Summary   Biomedical Conditions and Complications - The placing authority must use the criteria in Dimension II to determine a client s biomedical conditions and complications.   RISK DESCRIPTIONS - Severity ratin Client displays full functioning with good ability to cope with physical discomfort.    REASONS SEVERITY WAS ASSIGNED (What physical/medical problems does this person have that would inhibit his or her ability to participate in treatment? What issues does he or she have that require assistance to address?)    Clt reported medical conditions. Clt reported she has a pcp and is able to get the services she needs. Clt reported she takes her medications asa prescribed and directed by her  "provider for medical conditions. Clt reported numerous past ED admissions related to use.        DIMENSION III - Emotional, Behavioral, Cognitive Conditions and Complications     Childhood Environmental Background     1. Please tell me what it was like growing up in your family. (please include any history of substance abuse, mental health issues, emotional/physical/sexual abuse, forms of discipline, and support)     Per clt: raised by parents, mother and father are living, one sister living, and one brother who are living. \"Mother-bipolar-both her sisters schizophrenia\".Only person in family with CD issues both sides. Great childhood, mom was stay at home mother, mother struggled with bipolar, had some spouts throughout I can remember as a teenage before diagnosis, since being on medication she has been fine. When she had her spout one time that is when I had anorexia around age 14, which I am not sure went hand in hand but could have, went to three treatment centers in course of year, and got over that, and then happened in 20's had it again and got over that. No abuse within the family. Primarily grew up in [place], [place]. [place] most supported by parents. Mom and I struggled throughout out that, but over that and close as can be. Very close with parents.     GAIN Short Screener     2. When was the last time that you had significant problems...  A. with feeling very trapped, lonely, sad, blue, depressed or hopeless  about the future? Past Month-per clt:just the drinking. Year and half two years ago, probably has a lot to do with this, my fiance and I, he got an inheritance when his father passed away, we have a house in [place], work two weeks here and the go two weeks in [place], and it was easier to drink, there, lot less structure, not working there, before met him worked full time, same routine daily, get up and work, come home from work, and then get daughter in bed and then have a couple cocktails and then go to " bed. Since then less structure, working part time and then in [place] two weeks out of month. Been doing that schedule of back and forth back and forth for year and half. When in [place] fine when working here, but when in [place], do not have to work and daughter is with father, two weeks with father and then with me two weeks, joint custody. This schedule has something to do with drinking escalation.     B. with sleep trouble, such as bad dreams, sleeping restlessly, or falling  asleep during the day? Past Month-per clt: withdrawal and after not drinking horrible insomnia.    C. with feeling very anxious, nervous, tense, scared, panicked, or like  something bad was going to happen? Never    D. with becoming very distressed and upset when something reminded  you of the past? Never    E. with thinking about ending your life or committing suicide? Never    3. When was the last time that you did the following things two or more times?  A. Lied or conned to get things you wanted or to avoid having to do  something? Past Month    B. Had a hard time paying attention at school, work, or home? Never    C. Had a hard time listening to instructions at school, work, or home? Never    D. Were a bully or threatened other people? Never    E. Started physical fights with other people? Never    Note: These questions are from the Global Appraisal of Individual Needs--Short Screener. Any item marked  past month  or  2 to 12 months ago  will be scored with a severity rating of at least 2.     For each item that has occurred in the past month or past year ask follow up questions to determine how often the person has felt this way or has the behavior occurred? How recently? How has it affected their daily living? And, whether they were using or in withdrawal at the time?    4. If the person has answered item 9E with  in the past year  or  the past month , ask about frequency and history of suicide in the family or someone close and whether they  were under the influence.     NA    5. Has anyone close to you, a family member, a friend or a significant other attempted or completed a suicide?     Yes, explain:per clt: mother attempted a couple times growing up, one when really little, probably two more times before being diagnosed with bipolar.     6. If the person answered item 9E  in the past month  ask about intent, plan, means and access and any other follow-up information to determine imminent risk. Document any actions taken to intervene on any identified imminent risk.      NA    PHQ-9, RODERICK-7 and Suicide Risk Assessment   PHQ-9 on 7/24/2018 RODERICK-7 on 7/24/2018   The patient's PHQ-9 score was 5 out of 27, indicating mild depression.     The patient's RODERICK-7 score was 0 out of 21, indicating minimal anxiety.         [place]-Suicide Severity Rating Scale   Suicide Ideation   1.) Have you ever wished you were dead or that you could go to sleep and not wake up?     Lifetime:  No Past Month:  No   2.) Have you actually had any thoughts of killing yourself?   Lifetime:  No Past Month:  No   3.) Have you been thinking about how you might do this?     Lifetime:  NA Past Month:  NA   4.) Have you had these thoughts and had some intention of acting on them?     Lifetime:  NA Past Month:  NA   5.) Have you started to work out the details of how to kill yourself?   Lifetime:  NA Past Month:  NA   6.) Do you intend to carry out this plan?      Lifetime:  NA Past Month:  NA   Intensity of Ideation   Intensity of ideation (1 being least severe, 5 being most severe):     Lifetime:  NA Past Month:  NA   How often do you have these thoughts?  N/A      When you have the thoughts how long do they last?  N/A   Can you stop thinking about killing yourself or wanting to die if you want to?  N/A   Are there things - anyone or anything (i.e. family, [facility], pain of death) that stopped you from wanting to die or acting on thoughts of suicide?  Does not apply   What sort of  reasons did you have for thinking about wanting to die or killing yourself (ie end pain, stop how you were feeling, get attention or reaction, revenge)?  Does not apply   Suicidal Behavior   (Suicide Attempt) - Have you made a suicide attempt?     Lifetime:  No Past Month:  NA   Have you engaged in self-harm (non-suicidal self-injury)?  No   (Interrupted Attempt) - Has there been a time when you started to do something to end your life but someone or something stopped you before you actually did anything?  NA   (Aborted or Self-Interrupted Attempt) - Has there been a time when you started to do something to try to end your life but you stopped yourself before you actually did anything?  NA   (Preparatory Acts of Behavior) - Have you taken any steps towards making suicide attempt or preparing to kill yourself (such as collecting pills, getting a gun, giving valuables away or writing a suicide note)?  NA   Actual Lethality/Medical Damage:  NA     2008  [facility]  Used with permission by Yanna Santiago, PhD.       Guide to C-SSRS Risk Ratings   NO IDEATION:  with no active thoughts IDEATION: with a wish to die. IDEATION: with active thoughts. Risk Ratings   If Yes No No 0 - Very Low Risk   If NA Yes No 1 - Low Risk   If NA Yes Yes 2 - Low/moderate risk   IDEATION: associated thoughts of methods without intent or plan INTENT: Intent to follow through on suicide PLAN: Plan to follow through on suicide Risk Ratings cont...   If Yes No No 3 - Moderate Risk   If Yes Yes No 4 - High Risk   If Yes Yes Yes 5 - High Risk   The patient's ADDITIONAL RISK FACTORS and lack of PROTECTIVE FACTORS may increase their overall suicide risk ratings.     Additional Risk Factors:    No additional risk factors   Protective Factors:    Having people in his/her life that would prevent the patient from considering a suicide attempt (i.e. young children, spouse, parents, etc.)     An absence of mental health  "issues or stable and well treated mental health issues     An absence of chronic health problems or stable and well treated chronic health issues     A positive relationship with his/her clinical medical and/or mental health providers     Having restricted access to highly lethal means of suicide     Risk Status   0. - Very Low Risk:  Evaluation Counselors:  Document in Epic / SBAR to counselor \"Very Low Risk\".      Treatment Counselors:  Reassess upon admission as applicable, assess weekly in progress notes under Dimension 3 and summarize in Discharge / Treatment summary under Dimension 3.   Additional information to support suicide risk rating: There was no addtional information to provide at this time.  Please see the above suicide risk rating information.     Mental Health History and Mental Health Screening Questions     7. Have you ever been diagnosed with a mental health problem?     Yes, explain: per clt:anxiety, depression.      Per EMR intake:  Hx of Depression/Anxiety, prescribed Effexor.    8. Have you ever been prescribed medications for mental health issues?    Yes, explain: currently prescribed. Take as prescribed.    9. Have you ever worked with a mental health therapist?    Yes, explain: per clt:teenage, ton of counseling and therapy, even outside of treatment, as an adult none.     10. Do your current mental health providers know about your substance use history and/or about your current substance use?    Yes, explain:per clt: she noticed when had physical liver enzymes are increased. Never went into detail about it. Told me to drink less. Did not tell her severity of it.     11. Have you ever had an inpatient mental health hospital admission?    No    12. Have you ever hurt yourself, such as cutting, burning or hitting yourself?  No    13. Have you ever been verbally, emotionally, physically or sexually abused?      No    14. Have you lived through any traumatic or stressful life events, such as " "the death of someone close to you, witnessing violence, being a victim of crime, going through a bad break-up, or any other life event that had caused you significant distress?    No    15. If applicable, in the past month have you had any of the following symptoms related to the trauma, abuse or other stressful life events? (dreams, intense memories, flashbacks, physical reactions, etc.)     NA    16. If applicable, have you received counseling for trauma or abuse issues?      NA    17. Have you ever touched or fondled someone else inappropriately or forced them to have sex with you against their will?    No    18. Have you ever felt obsessed by your sexual behavior, such as having sex with many partners, masturbating often, using pornography often?  No    19. Have you ever purged, binged or restricted yourself as a way to control your weight?  Yes, explain: \"anorexia-teenager and early 20's\"    20. Have you ever believed people were spying on you, or that someone was plotting against you or trying to hurt you?  No    21. Have you ever believed someone was reading your mind or could hear your thoughts or that you could actually read someone's mind or hear what another person was thinking?  No    22. Have you ever believed that someone or some force outside of yourself was putting thoughts into your mind or made you act in a way that was not your usual self?  Have you ever thought you were possessed?  No    23. Have you ever believed you were being sent special messages through the TV, radio, newspaper or internet?  No    24. Have you ever heard things other people couldn't hear, such as voices or other noises?  No    25. Have you ever had visions when you were awake?  Or have you ever seen things other people couldn't see?  No    26. Have you ever had to lie to people important to you about how much you pike?  No    27. Have you ever felt the need to bet more and more money?  No    28. Have you ever attempted " treatment for a gambling problem?  No    29. Highest grade of school completed:  High school graduate/GED    30. Have you ever been diagnosed with a learning disability, such as ADHD or dyslexia?  No    31. What is your preferred learning style?  by reading    32. Do you have any problems with memory impairment or problem solving?  No    33. Do you have any problems with headaches or dizziness?  No    34. Have you ever been in the ?  No    35. Have you been diagnosed with traumatic brain injury or Alzheimer s?  No    36. Have you ever hit your head or been hit on the head?  No    37. Have you ever had medical treatment for an injury to your head?  No    38. Have you had any significant illness that affected your brain (brain tumor, meningitis, West Nile Virus, stroke, seizure, heart attack, near drowning or near suffocation)?  Yes, explain: past seizure four months ago, first one ever.     39. Have you ever been diagnosed with Fetal Alcohol Effects or Fetal Alcohol Syndrome?  No    40. What are your some of your personal strengths?  per clt:think about others, caring. Big heart    Dimension III Ratings Summary   Emotional/Behavioral/Cognitive - The placing authority must use the criteria in Dimension III to determine a client s emotional, behavioral, and cognitive conditions and complications.   RISK DESCRIPTIONS - Severity ratin Client has impulse control and coping skills. Client presents a mild to moderate risk of harm to self or others or displays symptoms of emotional, behavioral or cognitive problems. Client has a mental health diagnosis and is stable. Client functions adequately in significant life areas.    REASONS SEVERITY WAS ASSIGNED - What current issues might with thinking, feelings or behavior pose barriers to participation in a treatment program? What coping skills or other assets does the person have to offset those issues? Are these problems that can be initially accommodated by a  treatment provider? If not, what specialized skills or attributes must a provider have?    Clt reported mental health diagnosis. Clt reported she takes her medications as prescribed and directed for mental health symptoms by her pcp. Clt reported past therapy but denied current. Clt denied past trauma/abuse issues or current issues. Clt denied past SI. Clt denied past suicide attempts. Clt's PHQ-9 score was 5 out of 27, indicating mild depression. Clt's RODERICK-7 score was 0 out of 21, indicating minimal anxiety.       DIMENSION IV - Readiness to Change     1. What is your motivation for participating in this evaluation today?    Per clt:my parents. They kind of I do not want to be here, I know I have a problem. I do not quite know. I wish I could go back where I could have a couple drinks and then go to bed. I know that is not the right answer, but that is what I want. Tatiana has been through this with me having to go to ED and watching me seizure. Hard lifestyle in [facility] is all about going out, all his family drinks and do not have a problem like me so it is hard. Hurts me that I am decieving and lying about it, that is what I hate, that I am lying about it because I am not that person.     2. What problematic behaviors have you engaged in when using alcohol or other drugs that could be hazardous to you or others (i.e. driving a car/motorcycle/boat, operating machinery, unsafe sex, IV drug use, sharing needles, etc.)  (DSM-5: Sx #8)    Per clt:driving when under the influence.     3. If applicable, when did you first think you had a problem with your alcohol or other drug use?    Per clt: within the last year, year and half.     4. Who in your life has shared concerns with you about your use of alcohol or other drugs?    Per clt: fiance and parents.    5. Are there any changes you have made or plan to make regarding how you had been using alcohol or other drugs?    Yes, explain: per clt:do not think so. Points dumped  everything out in house. Fiance gone liquor store is right there and can get a bottle. 7-8 beer cans in [place] house that I have hidden before we left. 2-3 cups hid and one bottle hid. When he was sleeping threw them out and hide them in trash.     Dimension IV Ratings Summary   Readiness for Change - The placing authority must use the criteria in Dimension IV to determine a client s readiness for change.   RISK DESCRIPTIONS - Severity ratin Client is motivated with active reinforcement, to explore treatment and strategies for change, but ambivalent about illness or need for change.    REASONS SEVERITY WAS ASSIGNED - (What information did the person provide that supports your assessment of his or her readiness to change? How aware is the person of problems caused by continued use? How willing is she or he to make changes? What does the person feel would be helpful? What has the person been able to do without help?)      El reported motivation for assessment is her parents. El reported she knew her use was a problem within the last year, year and half. Clt reported her fiance and parents have expressed concern about her use. El reported she has in the past dumped alcohol out and had It out of the house but has been hiding alcohol as well recently. El appears to be in the contemplative stage of change evidenced by her verbal report and past behaviors.        DIMENSION V - Relapse, Continued Use and Continued Problem Potential     1. If you have had previous periods of sobriety, when was your longest period of sobriety and what were you doing at that time that was supporting your sobriety?  (DSM-5: Sx #2)    Per clt:stopped using for a month, after first bad withdrawal, one seizure, and other day, wake up, drink, fine, that was the first time it really freaked me out. Dumped everything out in the house, with fiance or best friend all the time, none of friends drink. OTC sleeping pills, went through withdrawals,  forced myself to do it because it scared me so bad. [place] nice to be healthy, worst part not being able to sleep, that last forever. Got to point went back to [facility], thought I could have a couple drinks or something and then turned into three days of binging and ended up in ED again in [facility].     2. Within the past 30 days, on a scale from 0-10 (0 = having no cravings at all and 10 = having very strong cravings to use alcohol or other drugs) what number would you assign to your cravings? (DSM-5: Sx #4)     Per clt: 5/10    3. Can you identify any specific reasons or specific triggers that contribute to you being more likely to consume alcohol or other drugs? (DSM-5: Sx #4)    Yes, explain:per clt: habitual-night time, insomnia, being in [place], and going out, to watch bands on [employer]. Being home, alone when I do not have to be sneaky about it.     4. Have you been treated for alcohol/other substance use disorder? (DSM-5: Sx #2)  Yes  4B. Number of times(lifetime) (over what period) 1.   4C. Number of times completed treatment (lifetime) 1.   4D. During the past three years have you participated in outpatient and/or residential?  No    5. Support group participation: Have you/do you attend 12-step or other support group meetings? How recently? What was your experience? Are you willing to restart? If the person has not participated, is he or she willing?  (DSM-5: Sx #2)    The patient denied having any history of attending 12-step or other support group meetings.    6. Do you drink alcohol or use other drugs in larger amounts than intended or over a longer period of time than was intended?  (DSM-5: Sx #1)    Yes, explain: daily drinking it was, now hiding it.    7. Do you spend a great deal of time engaged in activities necessary to obtain alcohol or other drugs, a great deal of time using alcohol or other drugs, or a great deal of time recovering from alcohol or other drug use?  (DSM-5: Sx #3)    Yes, explain:  drinking daily up to bottle per day during heaviest period.     Dimension V Ratings Summary   Relapse/Continued Use/Continued problem potential - The placing authority must use the criteria in Dimension V to determine a client s relapse, continued use, and continued problem potential.   RISK DESCRIPTIONS - Severity ratin No awareness of the negative impact of mental health problems or substance abuse. No coping skills to arrest mental health or addiction illnesses, or prevent relapse.    REASONS SEVERITY WAS ASSIGNED - (What information did the person provide that indicates his or her understanding of relapse issues? What about the person s experience indicates how prone he or she is to relapse? What coping skills does the person have that decrease relapse potential?)      Clt reported her longest period of sobriety in 7 years was a month. Clt reported she got rid of the alcohol and that was right after episode of major withdrawal. Clt reported what lead back to use was going to [place] and thinking she could have a couple drinks again after a month. Clt reported cravings and triggers. Clt reported triggers of environment, habit, being home alone, access, and insomnia. Clt reported one past treatment which she completed. Clt denied sober support group meeting attendance. Clt would appear to benefit from education about addiction and learning positive coping skills and relapse skills.UPDATE on 18: Client called and reported she relapsed and was hospitalized. Reported desire for inpatient treatment.        DIMENSION VI - Recovery Environment     1. Are you employed or attending school?    Per clt: part time, , [employer], been there 12 years.     2. If working or a student, are you able to function appropriately in that setting?     Yes    3. Has your job and/or school work been negatively impacted by your use of alcohol of other drugs?  (DSM-5: Sx #5 & Sx #7)    Yes, explain:per clt: times when I could  have had better job performance, in my mind I think I fool everyone that I am fine.     4. How would you describe your current finances?  Doing well     5. Are you having financial problems, such as money being tight, living paycheck to paycheck, having unpaid or late bills, having significant debt, a history of bankruptcy, or IRS problems?    No     6. Describe a typical day; evening for you. Work, school, social, leisure activities, volunteer, exercise, spiritual practices or other daily tasks.    Per clt: Here 2 weeks and [place] 2 weeks, work full time when here in [place]. Two days off, normally do not get up until need to go to work, come home on lunch break, live close, eat something and watch tv, then go back to work, then come back home, first thing I do make cocktail, now when come home, thinking about how to make the drink, so make up excuse, to go bathroom, slam drink instead in past would not have it over course of the evening, then go to sleep, tatiana stays up later than me. Days off errands, chores, running around.     7. Have you reduced or discontinued recreational activities, hobbies or other leisure activities as a result of your use of alcohol or other drugs?  (DSM-5: Sx #7)    No-per clt:do not feel I have hobbies, OCD, like to be clean, and organized, make a lot of list, actually hobbies do not have any I feel.     8. Who do you live with?      Per clt:Tatiana, and then has joint custody of daughter, two weeks been like that, every since teenager, before it was one week off and on, I get along with her father, she knows about my issues,and she does not like it and it has effected her. She is on medication for anxiety. She is introvert and has therapist. Straight A student, really smart.     9. Are there any people in the home who have current substance abuse issues or have mental health issues?     Currently uses alcohol but can take it or leave it. Does not have a problem with it.     10. Tell me  "about your living environment/neighborhood? Ask enough follow up questions to determine safety, criminal activity, availability of alcohol and drugs, supportive or antagonistic to the person making changes.      Per clt:safe and like it.    11. Are you concerned for your safety or anyone else's safety in the home? No    12. Do you have plans to move somewhere else or change your living environment in any manner?    No-not until daughter is going to daughter, may possible permanently move to [facility].    13. Do you have children who live with you?     Yes.  (Ask follow-up questions to determine the person s relationship and responsibility, both legal and care giving; and what arrangements are made for supervision for the children when the person is not available.) Hope 14-joint custody with her father, get her two weeks and he gets her two weeks. Good relationship with father. We get along.    14. Do you have children who do not live with you?     No    15. Do you have any history of being involved with Child Protection Services?  No     16. Are you currently in a significant relationship?     Yes.  How long have you been in the relationship?  2 years in June    17. How do you identify your sexual orientation?    Heterosexual    18. The patient reported: being single, in a serious relationship.    19. Does your significant other have a history of substance abuse or have current substance abuse issues?    See above    20. How important is substance use to your social connections? Do many of your family or friends use?     Per clt: best friends do not drink, on fiance side of family, always alcohol beverage, my side of family, no one drinks, not even during holidays. His family none are alcoholics but it is present.     21. Who in your life would you consider to be your primary support network at this time?    \"fiance, best friend, and parents\".    22. Have any of your relationships (S.O., family members, friends, " employers, teachers, etc.) been negatively impacted by your use of alcohol or other drugs?  (DSM-5: Sx #6)    Yes, explain: with fiance and daughter, and parents.     23. Do you currently participate in community yanci activities, such as attending Spiritism, temple, Moravian or Restoration services?  No    24. Criminal justice history: Gather current/recent history and any significant history related to substance use--Arrests? Convictions? Circumstances? Alcohol or drug involvement? Sentences? Still on probation or parole? Expectations of the court? Current court order?  (DSM-5: Sx #8)    Per clt: two DWIs, long time ago, probably like many years ago. Did not have problem with alcohol then, being dumb. On probation did not go to nursing home for it. No legal issues currently, ironic, now have issue no legal, past no issue, being dumb and got DWIs.    25. Are you or have you ever been a registered sex offender?  No    26. Do you have a child protection worker,  or ?  No    27. Are you currently on any type of commitment?  No, I'm not on any type of commitment at this time.    28. Do you have a valid 's license?  Yes    29. What obstacles exist to participating in treatment? (Time off work, childcare, funding, transportation, pending nursing home time, living situation)     Living schedule.     Dimension VI Ratings Summary   Recovery environment - The placing authority must use the criteria in Dimension VI to determine a client s recovery environment.   RISK DESCRIPTIONS - Severity ratin Client has (A) Chronically antagonistic significant other, living environment, family, peer group or long-term criminal justice involvement that is harmful to recovery or treatment progress, or (B) Client has an actively antagonistic significant other, family, work, or living environment with immediate threat to the client s safety and well-being.    REASONS SEVERITY WAS ASSIGNED - (What support does the person have  for making changes? What structure/stability does the person have in his or her daily life that will increase the likelihood that changes can be sustained? What problems exist in the person s environment that will jeopardize getting/staying clean and sober?)     El reported she works part time. Clt reported she lives with her fiance full time and her daughter two weeks out of the month as she has joint custody with daughter's father. Clt reported she is in [place] two weeks out of the month and in [place] two weeks out of the month as they have two homes. Jezt reported using is not important to social connections but her fiance's side of the family uses alcohol. El reported [place] is a trigger for her. El reported her fiance, best friends, and parents are her supportive network. El reported past legal issues but denied current. UPDATE on 7/31/18: Client called and reported she relapsed and was hospitalized. Reported desire for inpatient treatment.        Mental Health Status   Physical Appearance/Attire: Appears stated age and Neat   Hygiene: well groomed   Eye Contact: at examiner   Speech Rate:  regular   Speech Volume: regular   Speech Quality: fluid   Cognitive/Perceptual:  reality based   Cognition: memory intact    Judgment: able to concentrate   Insight: able to concentrate   Orientation:  time, place, person and situation   Thought:   concrete   Hallucinations:  none   General Behavioral Tone: cooperative   Psychomotor Activity: no problem noted   Gait:  no problem   Mood: normal and appropriate   Affect: congruence/appropriate   Counselor Notes: NA     Patient Choices/Exceptions     Would you like services specific to language, age, gender, culture, Yazidi preference, race, ethnicity, sexual orientation or disability?  No    What particular treatment choices and options would you like to have? IOP    Do you have a preference for a particular treatment program?  [facility]    Patient is willing to follow treatment recommendations.  Yes    DSM-5 Criteria for Substance Use Disorder   Criteria for Diagnosis  Instructions: Determine whether the client currently meets the criteria for Substance Use Disorder using the diagnostic criteria in the DSM-5 pp.[id number]. Current means during the most recent 12 months outside a facility that controls access to substances.    A problematic pattern of alcohol/drug use leading to clinically significant impairment or distress, as manifested by at least two of the following, occurring within a 12-month period:    1. Alcohol/drug is often taken in larger amounts or over a longer period than was intended.  2. There is a persistent desire or unsuccessful efforts to cut down or control alcohol/drug use  3. A great deal of time is spent in activities necessary to obtain alcohol/drug, use alcohol/drug, or recover from its effects.  4. Craving, or a strong desire or urge to use alcohol/drug  6. Continued alcohol use despite having persistent or recurrent social or interpersonal problems caused or exacerbated by the effects of alcohol/drug.  8. Recurrent alcohol/drug use in situations in which it is physically hazardous.  9. Alcohol/drug use is continued despite knowledge of having a persistent or recurrent physical or psychological problem that is likely to have been caused or exacerbated by alcohol.  10. Tolerance, as defined by either of the following: A need for markedly increased amounts of alcohol/drug to achieve intoxication or desired effect. and A markedly diminished effect with continued use of the same amount of alcohol/drug.  11. Withdrawal, as manifested by either of the following: The characteristic withdrawal syndrome for alcohol/drug (refer to Criteria A and B of the criteria set for alcohol/drug withdrawal).          Specify if: In early remission:  After full criteria for alcohol/drug use disorder were previously met, none of the criteria  for alcohol/drug use disorder have been met for at least 3 months but for less than 12 months (with the exception that Criterion A4,  Craving or a strong desire or urge to use alcohol/drug  may be met).     In sustained remission:   After full criteria for alcohol use disorder were previously met, non of the criteria for alcohol/drug use disorder have been met at any time during a period of 12 months or longer (with the exception that Criterion A4,  Craving or strong desire or urge to use alcohol/drug  may be met).   Specify if:   This additional specifier is used if the individual is in an environment where access to alcohol is restricted.    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    Collateral Contact      Name: Relationship: Phone number: Releases:   [place] Electronic Medical Records (EMR) Medical Records PRASHANT CERDA     [facility] medical record was reviewed for collateral purposes of this assessment. See throughout above assessment collateral obtained from EMR.       Collateral Contact      Name: Relationship: Phone number: Releases:   PRASHANT CERDA NA     Declined JARED for another contact at the time of the assessment.       Recommendations     1. Abstain from using all non-prescribed mood altering chemicals and substances. Take all medication as prescribed and directed by licensed physicians.  2. Attend and complete an  [facility]  3. Continue to follow all recommendations and referral made by other  providers such as pcp.       Level of Care   I.) Intoxication and Withdrawal: 0   II.) Biomedical:  0   III.) Emotional and Behavioral:  1   IV.) Readiness to Change:  1   V.) Relapse Potential: 4   VI.) Recovery Environmental: 4     Initial Problem List     The patient is currently living in an unhealthy and/or using environment  The patient lacks relapse prevention skills  The patient has poor coping skills  The patient lacks a sober peer support network

## 2018-07-24 NOTE — PROGRESS NOTES
"COMPREHENSIVE ASSESSMENT SUMMARY DICTATION    PATIENT NAME: Guadalupe Terry  MEDICAL RECORD NUMBER: [id number]  PATIENT ADDRESS: [address] [place] [id number]  HOME TELEPHONE NUMBER: [phone] (home)   MOBILE TELEPHONE NUMBER:   Telephone Information:   Mobile [phone]   INSURANCE: [employer]  STATISTICS: YOB: 1980     Age: 38 year old     Gender: female    RELATIONSHIP STATUS:  Single, in a serious relationship    DATE OF ASSESSMENT: 7/24/18-UPDATE on 7/31/18: Client called and reported she relapsed and was hospitalized. Reported desire for inpatient treatment.     EVALUATION COUNSELOR: INOCENCIO Leiva, [facility] 2]    REFERRAL SOURCE: Self    REASON FOR EVALUATION:     Per client:here because my parents mainly, dad recommended I get an assessment, he researched and found this place.    HEALTH HISTORY AND MEDICATIONS:     per client:high blood pressure, anxiety, depression, thyroid- hypotension.         Per EMR:       Past Medical History:   Diagnosis Date     Depressive disorder      Per client:  Per clt: levothyroxine, 25mg  Lisinopril 25mg  Birth control  Venlafaxine 150mg     HISTORY OF PREVIOUS TREATMENT AND COUNSELING:     Client reported one past treatment which she completed and past counseling, denied current counseling.     HISTORY OF ALCOHOL AND DRUG USE:     Alcohol: Age of first use: 16. Per EMR ED admission note on 5/28/18:\"       Patient presents with     Alcohol Problem         etoh withdrawal, shaking, soa, racing heart, nauseated   HPI  Guadalupe Terry is a 37 year old female with a long-standing history of alcohol abuse who presents for evaluation of withdrawal symptoms and a request to get assistance in sobering up.  Patient reports she has been a regular alcoholic over the last 7 years drinking heavily every night.  Over the past 3 nights she reports she \"maintained a steady buzz\" as she was off for the holiday.  Patient reports her last drink was around 4 AM " "this morning.  Patient woke up and was highly shaky but determined that she needs to stop drinking.  Patient reports she has stopped for brief periods times in the past and become very shaky, nauseated, and had severe insomnia.    Ethanol g/dL 0.02 (H) <0.01 g/dL   \".  Did not start drinking everyday on regular basis until 7 years ago. Never had alcohol in house prior to 7 years ago. Since seven years ago, daily, first five years, work, come home, have some cocktails and then go to bed, was functional though then, past year is when it has gotten bad. Daily, recently, as bad as probably consume, vodka is choice, one of large bottles of vodka in a 24 hour period at the worst. If I can keep it to evening. Worst it is has been last 3-4 months, come home on lunch break, not sure what would make me do it I would make a cocktail, some mornings wake up and make a drink. When doing that, dealing with, do that for three days, and then would stop, then had to go to ED for withdrawal. Last one being two weeks ago for ED admission. Since then I quit totally, for probably few days, since then it has just been at night. Since then no major binge episodes.  My fiance and parents do not know that I have drank since ED admission. Hiding it. Not at point where I would go through withdrawal at this point. 90% habit and then the rest is insomnia. Tatiana does not have problem with alcohol. He will just drink with me. He can take it or leave it though. Quit for almost a month, longest time in last 7 years. After first time going to ED, 4 months ago. Had a seizure. First time it ever happened. Date of last use 7/23/18. Meth/Amphetamine: Age of first use:24. Hx of it denied using since 12 years ago.Only did it for year and half. Went to tx and it was done.Date of last use 12 years ago. Nicotine: Age of first use:16. E-cig, quit smoking about a year and half ago. On and off since age 16. Quit multiple times and started. Long periods of " quitting and then starting again. E-cig- in my hand almost all the time. At work cannot do it, breaks, and sitting watching TV. Date of last use 7/24/18.      SUMMARY OF SUBSTANCE USE DISORDER SYMPTOMS ACKNOWLEDGED BY THE PATIENT: The patient identified positively with at least  6 of the 11 DSM-5 criteria for a primary diagnostic impression of substance use disorder severe.     SUMMARY OF COLLATERAL DATA:    [facility] Electronic Medical Records (EMR)-Clt medical record was reviewed for collateral purposes of this assessment. See throughout CD assessment collateral obtained from EMR. Declined JARED for another contact at the time of the assessment    IMPRESSION:    Alcohol Use Disorder Severe - 303.90 (F10.20)  Tobacco Use Disorder Mild - 305.10 (Z72.0)    [facility] PLACEMENT CRITERIA:    DIMENSION 1: Intoxication and Withdrawal:  The patient scored a 0.    Clt reported the reason for the assessment was because her parents wanted her to. Clt reported last use date of alcohol as 7/23/18 and nicotine as 7/24/18. Clt denied past admission to detox. Clt reported withdrawal symptoms.     DIMENSION 2: Biomedical Conditions:  The patient scored a 0.    Clt reported medical conditions. Clt reported she has a pcp and is able to get the services she needs. Clt reported she takes her medications asa prescribed and directed by her provider for medical conditions. Clt reported numerous past ED admissions related to use.    DIMENSION 3: Emotional and Behavioral:  The patient scored a 1.    Clt reported mental health diagnosis. Clt reported she takes her medications as prescribed and directed for mental health symptoms by her pcp. Clt reported past therapy but denied current. Clt denied past trauma/abuse issues or current issues. Clt denied past SI. Clt denied past suicide attempts. Clt's PHQ-9 score was 5 out of 27, indicating mild depression. Clt's RODERICK-7 score was 0 out of 21, indicating minimal anxiety.    DIMENSION 4: Readiness to  Change:  The patient scored a 1.    Clt reported motivation for assessment is her parents. Clt reported she knew her use was a problem within the last year, year and half. Clt reported her fiance and parents have expressed concern about her use. Clt reported she has in the past dumped alcohol out and had It out of the house but has been hiding alcohol as well recently. Clt appears to be in the contemplative stage of change evidenced by her verbal report and past behaviors.     DIMENSION 5: Relapse Potential:  The patient scored a 4.    Clt reported her longest period of sobriety in 7 years was a month. Clt reported she got rid of the alcohol and that was right after episode of major withdrawal. Clt reported what lead back to use was going to [place] and thinking she could have a couple drinks again after a month. Clt reported cravings and triggers. Clt reported triggers of environment, habit, being home alone, access, and insomnia. Clt reported one past treatment which she completed. Clt denied sober support group meeting attendance. Clt would appear to benefit from education about addiction and learning positive coping skills and relapse skills.UPDATE on 7/31/18: Client called and reported she relapsed and was hospitalized. Reported desire for inpatient treatment.       DIMENSION 6: Recovery Environment:  The patient scored a 4.    Clt reported she works part time. Clt reported she lives with her fiance full time and her daughter two weeks out of the month as she has joint custody with daughter's father. Clt reported she is in [place] two weeks out of the month and in [place] two weeks out of the month as they have two homes. Clt reported using is not important to social connections but her fiance's side of the family uses alcohol. Clt reported [place] is a trigger for her. Clt reported her fiance, best friends, and parents are her supportive network. Clt reported past legal issues but denied current. UPDATE on 7/31/18:  Client called and reported she relapsed and was hospitalized. Reported desire for inpatient treatment.       RECOMMENDATIONS:    1. Abstain from using all non-prescribed mood altering chemicals and substances. Take all medication as prescribed and directed by licensed physicians.  2. Attend and complete an  treatment program such as [facility].  3. Continue to follow all recommendations and referral made by other providers such as pcp.        INITIAL PROBLEM LIST:    The patient is currently living in an unhealthy and/or using environment  The patient lacks relapse prevention skills  The patient has poor coping skills  The patient lacks a sober peer support network    RATIONALE:Clt reported her use has negatively impacted multiple areas of her life and she currently meets criteria for a substance use disorder occurring within a 12-month period. Client is self-referral and would appear to benefit from education about addiction, learning positive coping skills, and relapse prevention skills.UPDATE on 7/31/18: Client called and reported she relapsed and was hospitalized. Reported desire for inpatient treatment.     This information has been disclosed to you from records protected by Federal confidentiality rules (42 CFR part 2). The Federal rules prohibit you from making any further disclosure of this information unless further disclosure is expressly permitted by the written consent of the person to whom it pertains or as otherwise permitted by 42 CFR part 2. A general authorization for the release of medical or other information is NOT sufficient for this purpose. The Federal rules restrict any use of the information to criminally investigate or prosecute any alcohol or drug abuse patient.

## 2018-07-25 ASSESSMENT — PATIENT HEALTH QUESTIONNAIRE - PHQ9: SUM OF ALL RESPONSES TO PHQ QUESTIONS 1-9: 5

## 2018-07-25 ASSESSMENT — ANXIETY QUESTIONNAIRES: GAD7 TOTAL SCORE: 0

## 2018-11-05 ENCOUNTER — OFFICE VISIT (OUTPATIENT)
Dept: FAMILY MEDICINE | Facility: CLINIC | Age: 38
End: 2018-11-05
Payer: COMMERCIAL

## 2018-11-05 VITALS
HEIGHT: 66 IN | WEIGHT: 181.2 LBS | TEMPERATURE: 99.2 F | OXYGEN SATURATION: 100 % | HEART RATE: 105 BPM | DIASTOLIC BLOOD PRESSURE: 80 MMHG | BODY MASS INDEX: 29.12 KG/M2 | SYSTOLIC BLOOD PRESSURE: 138 MMHG

## 2018-11-05 DIAGNOSIS — B30.9 VIRAL CONJUNCTIVITIS OF BOTH EYES: ICD-10-CM

## 2018-11-05 DIAGNOSIS — J02.9 SORE THROAT: Primary | ICD-10-CM

## 2018-11-05 DIAGNOSIS — J33.9 NASAL POLYPS: ICD-10-CM

## 2018-11-05 LAB
DEPRECATED S PYO AG THROAT QL EIA: NORMAL
SPECIMEN SOURCE: NORMAL

## 2018-11-05 PROCEDURE — 87880 STREP A ASSAY W/OPTIC: CPT | Performed by: FAMILY MEDICINE

## 2018-11-05 PROCEDURE — 99213 OFFICE O/P EST LOW 20 MIN: CPT | Performed by: FAMILY MEDICINE

## 2018-11-05 PROCEDURE — 87081 CULTURE SCREEN ONLY: CPT | Performed by: FAMILY MEDICINE

## 2018-11-05 RX ORDER — MIRTAZAPINE 30 MG/1
1 TABLET, FILM COATED ORAL AT BEDTIME
Refills: 1 | COMMUNITY
Start: 2018-09-18 | End: 2023-02-21

## 2018-11-05 RX ORDER — SULFACETAMIDE SODIUM 100 MG/ML
1 SOLUTION/ DROPS OPHTHALMIC
Qty: 1 BOTTLE | Refills: 0 | Status: SHIPPED | OUTPATIENT
Start: 2018-11-05 | End: 2018-11-12

## 2018-11-05 RX ORDER — GABAPENTIN 300 MG/1
1 CAPSULE ORAL
Refills: 1 | COMMUNITY
Start: 2018-09-24 | End: 2023-02-21

## 2018-11-05 RX ORDER — FLUTICASONE PROPIONATE 50 MCG
2 SPRAY, SUSPENSION (ML) NASAL DAILY
Qty: 1 BOTTLE | Refills: 11 | Status: SHIPPED | OUTPATIENT
Start: 2018-11-05

## 2018-11-05 RX ORDER — VENLAFAXINE HYDROCHLORIDE 75 MG/1
1 CAPSULE, EXTENDED RELEASE ORAL DAILY
Refills: 2 | COMMUNITY
Start: 2018-10-14

## 2018-11-05 NOTE — NURSING NOTE
"Chief Complaint   Patient presents with     URI       Initial /80 (BP Location: Right arm, Patient Position: Chair, Cuff Size: Adult Large)  Pulse 105  Temp 99.2  F (37.3  C) (Tympanic)  Ht 5' 6\" (1.676 m)  Wt 181 lb 3.2 oz (82.2 kg)  SpO2 100%  BMI 29.25 kg/m2 Estimated body mass index is 29.25 kg/(m^2) as calculated from the following:    Height as of this encounter: 5' 6\" (1.676 m).    Weight as of this encounter: 181 lb 3.2 oz (82.2 kg).      "

## 2018-11-05 NOTE — PROGRESS NOTES
"  SUBJECTIVE:   Guadalupe Terry is a 38 year old female who presents to clinic today for the following health issues:  Chief Complaint   Patient presents with     URI        ENT Symptoms  Woke up with eyes crusted and mattery.    Onset of symptoms: 3 days.  Started left eye.   She has had contacts out.    Improved but still red.   Cough for a week.  Has coughing spells.   No aches.   Some sore throat.   Some chronic nasal congestion.  She has had sinus surgery in the past.  Did not help.   No history of allergies.              Symptoms: cc Present Absent Comment   Fever/Chills   x    Fatigue   x    Muscle Aches   x    Eye Irritation  x  Bilateral eye redness and irritation   Sneezing   x    Nasal Kevin/Drg   x    Sinus Pressure/Pain  x  Off and on   Loss of smell   x    Dental pain   x    Sore Throat  x     Swollen Glands   x    Ear Pain/Fullness   x    Cough  x     Wheeze   x    Chest Pain   x    Shortness of breath   x    Rash   x    Other  x  Nose bleeds     Symptom duration:  3 days for eyes, cough for over a week   Symptom severity:  mild   Treatments tried:  ibuprofen, dayquil, nyquil   Contacts:  none     There is no problem list on file for this patient.       OBJECTIVE:Blood pressure 138/80, pulse 105, temperature 99.2  F (37.3  C), temperature source Tympanic, height 5' 6\" (1.676 m), weight 181 lb 3.2 oz (82.2 kg), SpO2 100 %. BMI=Body mass index is 29.25 kg/(m^2).  GENERAL APPEARANCE ADULT: Alert, no acute distress  EYES: PERRL, EOM normal, mild conjunctival erythema bilateral.  HENT: Ears and TMs normal, oral mucosa and posterior oropharynx normal, nose with polyps bilateral.   NECK: No adenopathy,masses or thyromegaly  RESP: lungs clear to auscultation      ASSESSMENT:   (J02.9) Sore throat  (primary encounter diagnosis)  Comment: viral sore throat.  Strep test is negative.  Plan: Strep, Rapid Screen          (J33.9) Nasal polyps  Comment:   Plan: fluticasone (FLONASE) 50 MCG/ACT spray        Try the " "Flonase nasal spray which should help shrink the polyps and open up your nose.     (B30.9) Viral conjunctivitis of both eyes  Comment:   Plan: sulfacetamide (BLEPH-10) 10 % ophthalmic         solution        Conjunctivitis (\"pinkeye\") is usually caused by a viral infection in eyes.  This is often associated with a upper respiratory infection \"cold\" infection.  Very infrequently it is a bacterial infection.  The eye infection usually clears over time like a \"cold\" and there are no antibiotics or medications that make it go away faster.  Eye medication can also be irritating to the eyes and cause more redness.    Eyes should be improving in the next 3-5 days.   If eyes are worsening or not improving, start eye drops.  Prescription given to use if needed.  Treatment with eye drops should result in improvement in 3-5 days, recheck if not improving.    Cough also should improve.    Let me know if shortness of breath, fever, getting worse over time.        "

## 2018-11-05 NOTE — MR AVS SNAPSHOT
"              After Visit Summary   11/5/2018    Guadalupe Terry    MRN: [id number]           Patient Information     Date Of Birth          1980        Visit Information        Provider Department      11/5/2018 5:00 PM Zoltan Nixon MD [facility]        Today's Diagnoses     Sore throat    -  1    Nasal polyps        Viral conjunctivitis of both eyes          Care Instructions    ASSESSMENT:   (J02.9) Sore throat  (primary encounter diagnosis)  Comment: viral sore throat.  Strep test is negative.  Plan: Strep, Rapid Screen          (J33.9) Nasal polyps  Comment:   Plan: fluticasone (FLONASE) 50 MCG/ACT spray        Try the Flonase nasal spray which should help shrink the polyps and open up your nose.     (B30.9) Viral conjunctivitis of both eyes  Comment:   Plan: sulfacetamide (BLEPH-10) 10 % ophthalmic         solution        Conjunctivitis (\"pinkeye\") is usually caused by a viral infection in eyes.  This is often associated with a upper respiratory infection \"cold\" infection.  Very infrequently it is a bacterial infection.  The eye infection usually clears over time like a \"cold\" and there are no antibiotics or medications that make it go away faster.  Eye medication can also be irritating to the eyes and cause more redness.    Eyes should be improving in the next 3-5 days.   If eyes are worsening or not improving, start eye drops.  Prescription given to use if needed.  Treatment with eye drops should result in improvement in 3-5 days, recheck if not improving.    Cough also should improve.    Let me know if shortness of breath, fever, getting worse over time.           Follow-ups after your visit        Who to contact     If you have questions or need follow up information about today's clinic visit or your schedule please contact [facility] directly at [phone].  Normal or non-critical lab and imaging results will be communicated to you by MyChart, letter " "or phone within 4 business days after the clinic has received the results. If you do not hear from us within 7 days, please contact the clinic through [employer] or phone. If you have a critical or abnormal lab result, we will notify you by phone as soon as possible.  Submit refill requests through [employer] or call your pharmacy and they will forward the refill request to us. Please allow 3 business days for your refill to be completed.          Additional Information About Your Visit        Care EveryWhere ID     This is your Care EveryWhere ID. This could be used by other organizations to access your [facility] medical records  [id number]        Your Vitals Were     Pulse Temperature Height Pulse Oximetry BMI (Body Mass Index)       105 99.2  F (37.3  C) (Tympanic) 5' 6\" (1.676 m) 100% 29.25 kg/m2        Blood Pressure from Last 3 Encounters:   11/05/18 138/80   05/29/18 143/87   03/26/17 129/82    Weight from Last 3 Encounters:   11/05/18 181 lb 3.2 oz (82.2 kg)   05/28/18 150 lb (68 kg)              We Performed the Following     Strep, Rapid Screen          Today's Medication Changes          These changes are accurate as of 11/5/18  6:01 PM.  If you have any questions, ask your nurse or doctor.               Start taking these medicines.        Dose/Directions    fluticasone 50 MCG/ACT spray   Commonly known as:  FLONASE   Used for:  Nasal polyps   Started by:  Zoltan Nixon MD        Dose:  2 spray   Spray 2 sprays into both nostrils daily   Quantity:  1 Bottle   Refills:  11       sulfacetamide 10 % ophthalmic solution   Commonly known as:  BLEPH-10   Used for:  Viral conjunctivitis of both eyes   Started by:  Zotlan Nixon MD        Dose:  1 drop   Apply 1 drop to eye every 4 hours (while awake) for 7 days   Quantity:  1 Bottle   Refills:  0            Where to get your medicines      These medications were sent to [facility] - [place], [place] - [address]  [address] [facility] " [facility] [address]     Phone:  [phone]     fluticasone 50 MCG/ACT spray         Some of these will need a paper prescription and others can be bought over the counter.  Ask your nurse if you have questions.     Bring a paper prescription for each of these medications     sulfacetamide 10 % ophthalmic solution                Primary Care Provider Office Phone # Fax #    Carmel Lino Jessica Sears [phone]       [facility] [address] [facility] [place] [facility] [address]        Equal Access to Services     CIELO REGALADO : Hadii aad ku hadasho Soomaali, waaxda luqadaha, qaybta kaalmada adeegyada, waxay idiin hayjessyn jeff valle. So [facility] [phone].    ATENCIÓN: Si habla español, tiene a diaz disposición servicios gratuitos de asistencia lingüística. [facility] [phone].    We comply with applicable federal civil rights laws and [place] laws. We do not discriminate on the basis of race, color, national origin, age, disability, sex, sexual orientation, or gender identity.            Thank you!     Thank you for choosing [facility]  for your care. Our goal is always to provide you with excellent care. Hearing back from our patients is one way we can continue to improve our services. Please take a few minutes to complete the written survey that you may receive in the mail after your visit with us. Thank you!             Your Updated Medication List - Protect others around you: Learn how to safely use, store and throw away your medicines at www.disposemymeds.org.          This list is accurate as of 11/5/18  6:01 PM.  Always use your most recent med list.                   Brand Name Dispense Instructions for use Diagnosis    fish oil-omega-3 fatty acids 1000 MG capsule           fluticasone 50 MCG/ACT spray    FLONASE    1 Bottle    Spray 2 sprays into both nostrils daily    Nasal polyps       gabapentin 300 MG capsule    NEURONTIN     1 capsule nightly as needed         levothyroxine 25 MCG tablet    SYNTHROID/LEVOTHROID     Take 25 mcg by mouth        lisinopril-hydrochlorothiazide 20-25 MG per tablet    PRINZIDE/ZESTORETIC     Take 1 tablet by mouth        mirtazapine 30 MG tablet    REMERON     1 tablet At Bedtime        MULTI-VITAMINS Tabs      Take 1 tablet by mouth        norgestim-eth estrad triphasic 0.18/0.215/0.25 MG-35 MCG per tablet    ORTHO TRI-CYCLEN, TRI-SPRINTEC     Take 1 tablet by mouth        sulfacetamide 10 % ophthalmic solution    BLEPH-10    1 Bottle    Apply 1 drop to eye every 4 hours (while awake) for 7 days    Viral conjunctivitis of both eyes       * venlafaxine 150 MG 24 hr capsule    EFFEXOR-XR     Take 150 mg by mouth        * venlafaxine 75 MG 24 hr capsule    EFFEXOR-XR     1 capsule daily With 150 mg to make 225 mg daily        vitamin D3 1000 units Caps      Take 1,000 Units by mouth        * Notice:  This list has 2 medication(s) that are the same as other medications prescribed for you. Read the directions carefully, and ask your doctor or other care provider to review them with you.

## 2018-11-05 NOTE — LETTER
November 7, 2018      Guadalupe Terry  [address] [facility] [address]        Dear Guadalupe,           This letter is to inform you that the results of your recent throat culture are negative.  If you have any questions please call or make an appointment.          Sincerely,        Zoltan Nixon MD

## 2018-11-06 LAB
BACTERIA SPEC CULT: NORMAL
SPECIMEN SOURCE: NORMAL

## 2018-11-06 NOTE — PATIENT INSTRUCTIONS
"ASSESSMENT:   (J02.9) Sore throat  (primary encounter diagnosis)  Comment: viral sore throat.  Strep test is negative.  Plan: Strep, Rapid Screen          (J33.9) Nasal polyps  Comment:   Plan: fluticasone (FLONASE) 50 MCG/ACT spray        Try the Flonase nasal spray which should help shrink the polyps and open up your nose.     (B30.9) Viral conjunctivitis of both eyes  Comment:   Plan: sulfacetamide (BLEPH-10) 10 % ophthalmic         solution        Conjunctivitis (\"pinkeye\") is usually caused by a viral infection in eyes.  This is often associated with a upper respiratory infection \"cold\" infection.  Very infrequently it is a bacterial infection.  The eye infection usually clears over time like a \"cold\" and there are no antibiotics or medications that make it go away faster.  Eye medication can also be irritating to the eyes and cause more redness.    Eyes should be improving in the next 3-5 days.   If eyes are worsening or not improving, start eye drops.  Prescription given to use if needed.  Treatment with eye drops should result in improvement in 3-5 days, recheck if not improving.    Cough also should improve.    Let me know if shortness of breath, fever, getting worse over time.   "

## 2019-05-20 ENCOUNTER — TELEPHONE (OUTPATIENT)
Dept: FAMILY MEDICINE | Facility: CLINIC | Age: 39
End: 2019-05-20

## 2019-05-20 NOTE — LETTER
[facility]  [address]  [facility] [address]  Phone: [phone]      5/20/2019     Guadalupe Terry  [address] [facility] [address]      Dear Guadalupe:    I have reviewed your chart in efforts to improve your health care and found you are due for a physical with pap screening. To schedule this appointment, please call the clinic scheduling desk at [phone].    Health Maintenance   Topic Date Due     PREVENTIVE CARE VISIT  1980     PAP  1980     HIV SCREENING  07/17/1995     PHQ-2  01/01/2019     INFLUENZA VACCINE (Season Ended) 09/01/2019     DTAP/TDAP/TD IMMUNIZATION (3 - Td) 11/05/2023     ZOSTER IMMUNIZATION (1 of 2) 07/17/2030     IPV IMMUNIZATION  Aged Out     MENINGITIS IMMUNIZATION  Aged Out     If you have any additional questions or concerns, or if you have had testing done elsewhere, please notify your clinic, take care!    For quick and easy access to your test results, health care records, to message your care team, or to schedule an appointment, visit the link below or download the My Chart Medical keny to your smart phone.   [email or web address]    Sincerely,      Dr. Nixon/Susan Ford MA

## 2019-05-20 NOTE — TELEPHONE ENCOUNTER
Panel Management Review       Patient has the following on her problem list: There is no problem list on file for this patient.        Composite cancer screening  Chart review shows that this patient is due/due soon for the following   Health Maintenance   Topic Date Due     PREVENTIVE CARE VISIT  1980     PAP  1980     HIV SCREENING  07/17/1995     PHQ-2  01/01/2019     INFLUENZA VACCINE (Season Ended) 09/01/2019     DTAP/TDAP/TD IMMUNIZATION (3 - Td) 11/05/2023     ZOSTER IMMUNIZATION (1 of 2) 07/17/2030     IPV IMMUNIZATION  Aged Out     MENINGITIS IMMUNIZATION  Aged Out     Summary:    Patient is due/failing the following:   PE with pap screening     Action needed:   Patient needs office visit.    Type of outreach:    Sent letter.    Questions for provider review:    None                                                                                                                                    Susan Ford MA     Chart routed to none .

## 2020-10-20 ENCOUNTER — VIRTUAL VISIT (OUTPATIENT)
Dept: FAMILY MEDICINE | Facility: OTHER | Age: 40
End: 2020-10-20
Payer: COMMERCIAL

## 2020-10-20 PROCEDURE — 99421 OL DIG E/M SVC 5-10 MIN: CPT | Performed by: PHYSICIAN ASSISTANT

## 2020-10-20 NOTE — PROGRESS NOTES
"Date: 10/20/2020 11:39:37  Clinician: Antwan Freedman  Clinician NPI: [id number]  Patient: Guadalupe Terry  Patient : 1980  Patient Address: [address] [place] [id number]  Patient Phone: [phone]  Visit Protocol: URI  Patient Summary:  Guadalupe is a 40 year old ( : 1980 ) female who initiated a [facility] Visit for COVID-19 (Coronavirus) evaluation and screening. When asked the question \"Please sign me up to receive news, health information and promotions from [facility].\", Guadalupe responded \"No\".    Guadalupe states her symptoms started gradually 7-9 days ago.   Her symptoms consist of ear pain, a headache, a cough, anosmia, facial pain or pressure, myalgia, chills, malaise, ageusia, and diarrhea. Guadalupe also feels feverish but was unable to measure her temperature.   Symptom details     Cough: Guadalupe coughs almost every minute and her cough is more bothersome at night. Phlegm does not come into her throat when she coughs. She does not believe her cough is caused by post-nasal drip.     Facial pain or pressure: The facial pain or pressure feels worse when bending over or leaning forward.     Headache: She states the headache is severe (7-9 on a 10 point pain scale).      Guadalupe denies having wheezing, nasal congestion, vomiting, rhinitis, nausea, sore throat, and teeth pain. She also denies double sickening (worsening symptoms after initial improvement), having a sinus infection within the past year, and having recent facial or sinus surgery in the past 60 days. She is not experiencing dyspnea.   Precipitating events  She has not recently been exposed to someone with influenza. Guadalupe has been in close contact with the following high risk individuals: immunocompromised people.   Pertinent COVID-19 (Coronavirus) information  In the past 14 days, Guadalupe has not worked in a congregate living setting.   She does not work or volunteer as healthcare worker or a  and " does not work or volunteer in a healthcare facility.   Guadalupe also has not lived in a congregate living setting in the past 14 days. She does not live with a healthcare worker.   Guadalupe has not had a close contact with a laboratory-confirmed COVID-19 patient within 14 days of symptom onset.   Since December 2019, Guadalupe and has not had upper respiratory infection or influenza-like illness. Has not been diagnosed with lab-confirmed COVID-19 test   Pertinent medical history  Guadalupe has taken an antibiotic medication in the past month. Antibiotic details as reported by the patient (free text): Amoxicillin   Guadalupe does not get yeast infections when she takes antibiotics.   Guadalupe needs a return to work/school note.   Weight: 200 lbs   Guadalupe smokes or uses smokeless tobacco.   She denies pregnancy and denies breastfeeding. She does not menstruate.   Weight: 200 lbs    MEDICATIONS: Effexor XR oral, ALLERGIES: NKDA  Clinician Response:  Dear Guadalupe,  Based on the information provided, you have acute bacterial sinusitis, also known as a sinus infection. Sinus infections are caused by bacteria or a virus and symptoms are almost always identical. The difference between the 2 types of infections is timing.  Sinus infections start as viral infections and symptoms improve on their own in about 7 days. If symptoms have not improved after 7 days or have even worsened, a bacterial infection may have developed.  Medication information  I am prescribing:       Azithromycin (Zithromax Z-Jerod) 250 mg oral tablet. Take 2 tablets by mouth on day 1, then 1 tablet by mouth on days 2-5. There are no refills with this prescription.      Benzonatate (Tessalon Perles) 100 mg oral capsule. Take 1-2 capsules by mouth 3 times per day as needed for your cough. There are no refills with this prescription.     Yeast infections can be a common side effect of antibiotics. The most common symptom of a yeast infection is  itchiness in and around the vagina. Other signs and symptoms include burning, redness, or a thick, white vaginal discharge that looks like cottage cheese and does not have a bad smell.  If you become pregnant during this course of treatment, stop taking the medication and contact your primary care provider.  Self care  Steps you can take to be as comfortable as possible:     Rest.    Drink plenty of fluids.    Take a spoonful of honey to reduce your cough.     Also, as your provider, I need you to know that becoming tobacco-free is the most important thing you can do to protect your current and future health.  When to seek care  Please be seen in a clinic or urgent care if any of the following occur:     New symptoms develop, or symptoms become worse    Symptoms do not start to improve after 3 days of treatment     Call ahead before going to the clinic or urgent care.  It is possible to have an allergic reaction to an antibiotic even if you have not had one in the past. If you notice a new rash, significant swelling, or difficulty breathing, stop taking this medication immediately and go to a clinic or urgent care.  Additional treatment plan   Your symptoms show that you may have coronavirus (COVID-19). This illness can cause fever, cough and trouble breathing. Many people get a mild case and get better on their own. Some people can get very sick.  What should I do?  We would like to test you for this virus.   1. Please call [phone] to schedule your visit. Explain that you were referred by [facility] to have a COVID-19 test. Be ready to share your [facility] visit ID number.  The following will serve as your written order for this COVID Test, ordered by me, for the indication of suspected COVID [Z20.828]: The test will be ordered in [employer], our electronic health record, after you are scheduled. It will show as ordered and authorized by Darryl Batista MD.  Order: COVID-19 (Coronavirus) PCR for SYMPTOMATIC testing from  "[facility].      2. When it's time for your COVID test:  Stay at least 6 feet away from others. (If someone will drive you to your test, stay in the backseat, as far away from the  as you can.)   Cover your mouth and nose with a mask, tissue or washcloth.  Go straight to the testing site. Don't make any stops on the way there or back.      3.Starting now: Stay home and away from others (self-isolate) until:   You've had no fever---and no medicine that reduces fever---for one full day (24 hours). And...   Your other symptoms have gotten better. For example, your cough or breathing has improved. And...   At least 10 days have passed since your symptoms started.       During this time, don't leave the house except for testing or medical care.   Stay in your own room, even for meals. Use your own bathroom if you can.   Stay away from others in your home. No hugging, kissing or shaking hands. No visitors.  Don't go to work, school or anywhere else.    Clean \"high touch\" surfaces often (doorknobs, counters, handles, etc.). Use a household cleaning spray or wipes. You'll find a full list of  on the EPA website: www.epa.gov/pesticide-registration/list-n-disinfectants-use-against-sars-cov-2.   Cover your mouth and nose with a mask, tissue or washcloth to avoid spreading germs.  Wash your hands and face often. Use soap and water.  Caregivers in these groups are at risk for severe illness due to COVID-19:  o People 65 years and older  o People who live in a nursing home or long-term care facility  o People with chronic disease (lung, heart, cancer, diabetes, kidney, liver, immunologic)  o People who have a weakened immune system, including those who:   Are in cancer treatment  Take medicine that weakens the immune system, such as corticosteroids  Had a bone marrow or organ transplant  Have an immune deficiency  Have poorly controlled HIV or AIDS  Are obese (body mass index of 40 or higher)  Smoke regularly   o " Caregivers should wear gloves while washing dishes, handling laundry and cleaning bedrooms and bathrooms.  o Use caution when washing and drying laundry: Don't shake dirty laundry, and use the warmest water setting that you can.  o For more tips, go to www.cdc.gov/coronavirus/2019-ncov/downloads/10Things.pdf.    How can I take care of myself?    Get lots of rest. Drink extra fluids (unless a doctor has told you not to).   Take Tylenol (acetaminophen) for fever or pain. If you have liver or kidney problems, ask your family doctor if it's okay to take Tylenol.   Adults can take either:    650 mg (two 325 mg pills) every 4 to 6 hours, or...   1,000 mg (two 500 mg pills) every 8 hours as needed.    Note: Don't take more than 3,000 mg in one day. Acetaminophen is found in many medicines (both prescribed and over-the-counter medicines). Read all labels to be sure you don't take too much.   For children, check the Tylenol bottle for the right dose. The dose is based on the child's age or weight.    If you have other health problems (like cancer, heart failure, an organ transplant or severe kidney disease): Call your specialty clinic if you don't feel better in the next 2 days.       Know when to call 911. Emergency warning signs include:    Trouble breathing or shortness of breath Pain or pressure in the chest that doesn't go away Feeling confused like you haven't felt before, or not being able to wake up Bluish-colored lips or face.  Where can I get more information?   [facility] -- About COVID-19: [email or web address]   CDC -- What to Do If You're Sick: www.cdc.gov/coronavirus/2019-ncov/about/steps-when-sick.html   CDC -- Ending Home Isolation: www.cdc.gov/coronavirus/2019-ncov/hcp/disposition-in-home-patients.html   CDC -- Caring for Someone: www.cdc.gov/coronavirus/2019-ncov/if-you-are-sick/care-for-someone.html   [facility] -- Interim Guidance for Hospital Discharge to Home:  www.health.[facility].[place].us/diseases/coronavirus/hcp/hospdischarge.pdf   [facility] clinical trials (COVID-19 research studies): [email or web address]-trials    Below are the COVID-19 hotlines at the [place] Department of Health ([facility]). Interpreters are available.    For health questions: Call 551-964-6013 or 1-728.230.3300 (7 a.m. to 7 p.m.) For questions about schools and childcare: Call 660-944-6147 or 1-719.923.3270 (7 a.m. to 7 p.m.)    Diagnosis: Contact with and (suspected) exposure to other viral communicable diseases  Diagnosis ICD: Z20.828  Prescription: azithromycin (Zithromax Z-Jerod) 250 mg oral tablet 6 tablet, 5 days supply. Take 2 tablets by mouth on day 1, then 1 tablet by mouth on days 2-5. Refills: 0, Refill as needed: no, Allow substitutions: yes  Prescription: benzonatate (Tessalon Perles) 100 mg oral capsule 30 capsule, 5 days supply. Take 1-2 capsules by mouth 3 times per day as needed. Refills: 0, Refill as needed: no, Allow substitutions: yes

## 2020-10-21 ENCOUNTER — HOSPITAL ENCOUNTER (EMERGENCY)
Facility: CLINIC | Age: 40
Discharge: HOME | End: 2020-10-21
Attending: NURSE PRACTITIONER | Admitting: NURSE PRACTITIONER
Payer: COMMERCIAL

## 2020-10-21 VITALS
WEIGHT: 181 LBS | HEART RATE: 89 BPM | BODY MASS INDEX: 29.21 KG/M2 | TEMPERATURE: 99.2 F | DIASTOLIC BLOOD PRESSURE: 97 MMHG | OXYGEN SATURATION: 95 % | RESPIRATION RATE: 16 BRPM | SYSTOLIC BLOOD PRESSURE: 142 MMHG

## 2020-10-21 DIAGNOSIS — R68.89 FLU-LIKE SYMPTOMS: ICD-10-CM

## 2020-10-21 DIAGNOSIS — Z20.822 ENCOUNTER FOR LABORATORY TESTING FOR COVID-19 VIRUS: ICD-10-CM

## 2020-10-21 PROCEDURE — 99283 EMERGENCY DEPT VISIT LOW MDM: CPT | Performed by: NURSE PRACTITIONER

## 2020-10-21 PROCEDURE — U0003 INFECTIOUS AGENT DETECTION BY NUCLEIC ACID (DNA OR RNA); SEVERE ACUTE RESPIRATORY SYNDROME CORONAVIRUS 2 (SARS-COV-2) (CORONAVIRUS DISEASE [COVID-19]), AMPLIFIED PROBE TECHNIQUE, MAKING USE OF HIGH THROUGHPUT TECHNOLOGIES AS DESCRIBED BY CMS-2020-01-R: HCPCS | Performed by: NURSE PRACTITIONER

## 2020-10-21 PROCEDURE — 99284 EMERGENCY DEPT VISIT MOD MDM: CPT | Performed by: NURSE PRACTITIONER

## 2020-10-21 PROCEDURE — C9803 HOPD COVID-19 SPEC COLLECT: HCPCS | Performed by: NURSE PRACTITIONER

## 2020-10-21 NOTE — ED PROVIDER NOTES
"  History     Chief Complaint   Patient presents with     Cough     Pt states that she has had loss of appetite, loss of taste and smell     Fatigue     Diarrhea     HPI      SUBJECTIVE: Guadalupe Terry  is here today because of:\"Flu\"  The patient has had symptoms of fever, cough, earache, sore throat, nasal congestion/runny nose, facial pressure, sinus pain, myalgias, decreased appetite, decreased activity and loss of sense of taste and smell.   Onset of symptoms was 7 days ago. Course of illness is improving over the past 24 hours.  Patient denies exposure to illness at home or work/school.   Patient denies headache and mental confusion, abdominal pain, left or right sided body weakness,   Treatment measures tried include amoxicillin since last Wednesday and is day 8 today for sinus infection.  Patient is exposed to tobacco-utilizes e-cigarettes  Patient reports unable to return to work until she has had Covid testing and that is what she is requesting today.    Allergies:  Allergies   Allergen Reactions     Hydrocodone Itching     No Clinical Screening - See Comments Itching       Problem List:    There are no active problems to display for this patient.       Past Medical History:    Past Medical History:   Diagnosis Date     Depressive disorder        Past Surgical History:    History reviewed. No pertinent surgical history.    Family History:    Family History   Problem Relation Age of Onset     Depression Mother      Bipolar Disorder Mother      Depression Sister      Anxiety Disorder Daughter        Social History:  Marital Status:  Single [1]  Social History     Tobacco Use     Smoking status: Current Every Day Smoker     Types: Other     Smokeless tobacco: Never Used   Substance Use Topics     Alcohol use: Yes     Comment: daily was a bottle per day could go through, since recent detox 2 drinks at night     Drug use: No        Medications:         Cholecalciferol (VITAMIN D3) 1000 UNITS CAPS       fish " oil-omega-3 fatty acids 1000 MG capsule       fluticasone (FLONASE) 50 MCG/ACT spray       gabapentin (NEURONTIN) 300 MG capsule       levothyroxine (SYNTHROID/LEVOTHROID) 25 MCG tablet       lisinopril-hydrochlorothiazide (PRINZIDE/ZESTORETIC) 20-25 MG per tablet       mirtazapine (REMERON) 30 MG tablet       Multiple Vitamin (MULTI-VITAMINS) TABS       norgestim-eth estrad triphasic (ORTHO TRI-CYCLEN, TRI-SPRINTEC) 0.18/0.215/0.25 MG-35 MCG per tablet       venlafaxine (EFFEXOR-XR) 150 MG 24 hr capsule       venlafaxine (EFFEXOR-XR) 75 MG 24 hr capsule      Review of Systems  As mentioned above in the history present illness. All other systems were reviewed and are negative.    Physical Exam   BP: (!) 142/97  Pulse: 89  Temp: 99.2  F (37.3  C)  Resp: 16  Weight: 82.1 kg (181 lb)  SpO2: 95 %      Physical Exam  GENERAL: alert, no acute distress and no apparent distress  EYES:  Right conjunctiva is not injected and without discharge.  Left conjunctiva is not injected and without discharge.  EARS: Right TM is normal: no effusions, no erythema, and normal landmarks.  Left TM is normal: no effusions, no erythema, and normal landmarks.  NOSE: Nasal mucosa is normal.  Sinus not tender.  THROAT: normal.  NECK: supple with no adenopathy  CARDIAC:NORMAL - regular rate and rhythm without murmur.  RESP: Normal - CTA without rales, rhonchi, or wheezing.  SKIN: normal    ED Course        Procedures       No results found for this or any previous visit (from the past 24 hour(s)).    Medications - No data to display    Assessments & Plan (with Medical Decision Making)     I have reviewed the nursing notes.    I have reviewed the findings, diagnosis, plan and need for follow up with the patient.  Medical Decision Making:  CXR is not indicated.  Rapid Strep test is not indicated.     Assessment:  1) Viral syndrome cannot exclude covid or influenza.  Covid test indicated and pending.    PLAN:  Covid precautions  increase fluids and  rest.   Follow up with any questions or problems    Discharge Medication List as of 10/21/2020 11:33 AM          Final diagnoses:   Flu-like symptoms   Encounter for laboratory testing for COVID-19 virus       10/21/2020   [facility] EMERGENCY DEPT     Mica Liang, SATISH CNP  10/21/20 2136

## 2020-10-21 NOTE — ED AVS SNAPSHOT
[facility]  [address] [address]  Phone: [phone]  Fax: [phone]                                    Guadalupe Terry   MRN: [id number]    Department: [facility]   Date of Visit: 10/21/2020           After Visit Summary Signature Page    I have received my discharge instructions, and my questions have been answered. I have discussed any challenges I see with this plan with the nurse or doctor.    ..........................................................................................................................................  Patient/Patient Representative Signature      ..........................................................................................................................................  Patient Representative Print Name and Relationship to Patient    ..................................................               ................................................  Date                                   Time    ..........................................................................................................................................  Reviewed by Signature/Title    ...................................................              ..............................................  Date                                               Time          22EPIC Rev 08/18

## 2020-10-21 NOTE — LETTER
October 21, 2020      To Whom It May Concern:      Guadalupe Terry was seen in our Emergency Department today, 10/21/20.  Please excuse patient from work due to flu-like symptoms.  Covid test is pending.  If covid is positive, pt needs to quarantine for 14 days from onset of symptoms.    Sincerely,        SATISH Sterling CNP

## 2020-10-22 LAB
SARS-COV-2 RNA SPEC QL NAA+PROBE: ABNORMAL
SPECIMEN SOURCE: ABNORMAL

## 2020-10-23 ENCOUNTER — TELEPHONE (OUTPATIENT)
Dept: EMERGENCY MEDICINE | Facility: CLINIC | Age: 40
End: 2020-10-23

## 2020-10-23 NOTE — TELEPHONE ENCOUNTER
"Coronavirus (COVID-19) Notification    Caller Name (Patient, parent, daughter/son, grandparent, etc)  Patient     Reason for call  Notify of Positive Coronavirus (COVID-19) lab results, assess symptoms,  review [facility] recommendations    Lab Result    Lab test:  2019-nCoV rRt-PCR or SARS-CoV-2 PCR    Oropharyngeal AND/OR nasopharyngeal swabs is POSITIVE for 2019-nCoV RNA/SARS-COV-2 PCR (COVID-19 virus)    RN Recommendations/Instructions per [facility] Coronavirus COVID-19 recommendations    Brief introduction script  Introduce self then review script:  \"I am calling on behalf of [employer].  We were notified that your Coronavirus test (COVID-19) for was POSITIVE for the virus.  I have some information to relay to you but first I wanted to mention that the [place] Dept of Health will be contacting you shortly [it's possible MD already called Patient] to talk to you more about how you are feeling and other people you have had contact with who might now also have the virus.  Also, [facility] is Partnering with the [facility] for Covid-19 research, you may be contacted directly by research staff.\"    Assessment (Inquire about Patient's current symptoms)   Assessment   Current Symptoms at time of phone call: (if no symptoms, document No symptoms]  tired   Symptoms onset (if applicable)  10/18/20     If at time of call, Patients symptoms hare worsened, the Patient should contact 911 or have someone drive them to Emergency Dept promptly:      If Patient calling 911, inform 911 personal that you have tested positive for the Coronavirus (COVID-19).  Place mask on and await 911 to arrive.    If Emergency Dept, If possible, please have another adult drive you to the Emergency Dept but you need to wear mask when in contact with other people.      Review information with Patient    Your result was positive. This means you have COVID-19 (coronavirus).  We have sent you a letter that reviews the " information that I'll be reviewing with you now.    How can I protect others?    If you have symptoms: stay home and away from others (self-isolate) until:    You've had no fever--and no medicine that reduces fever--for 1 full day (24 hours). And       Your other symptoms have gotten better. For example, your cough or breathing has improved. And     At least 10 days have passed since your symptoms started. (If you've been told by a doctor that you have a weak immune system, wait 20 days.)     If you don't have symptoms: Stay home and away from others (self-isolate) until at least 10 days have passed since your first positive COVID-19 test. (Date test collected)    During this time:    Stay in your own room, including for meals. Use your own bathroom if you can.    Stay away from others in your home. No hugging, kissing or shaking hands. No visitors.     Don't go to work, school or anywhere else.     Clean  high touch  surfaces often (doorknobs, counters, handles, etc.). Use a household cleaning spray or wipes. You'll find a full list on the EPA website at www.epa.gov/pesticide-registration/list-n-disinfectants-use-against-sars-cov-2.     Cover your mouth and nose with a mask, tissue or other face covering to avoid spreading germs.    Wash your hands and face often with soap and water.    Caregivers in these groups are at risk for severe illness due to COVID-19:  o People 65 years and older  o People who live in a nursing home or long-term care facility  o People with chronic disease (lung, heart, cancer, diabetes, kidney, liver, immunologic)  o People who have a weakened immune system, including those who:  - Are in cancer treatment  - Take medicine that weakens the immune system, such as corticosteroids  - Had a bone marrow or organ transplant  - Have an immune deficiency  - Have poorly controlled HIV or AIDS  - Are obese (body mass index of 40 or higher)  - Smoke regularly    Caregivers should wear gloves while  washing dishes, handling laundry and cleaning bedrooms and bathrooms.    Wash and dry laundry with special caution. Don't shake dirty laundry, and use the warmest water setting you can.    If you have a weakened immune system, ask your doctor about other actions you should take.    For more tips, go to www.cdc.gov/coronavirus/2019-ncov/downloads/10Things.pdf.    You should not go back to work until you meet the guidelines above for ending your home isolation. You don't need to be retested for COVID-19 before going back to work--studies show that you won't spread the virus if it's been at least 10 days since your symptoms started (or 20 days, if you have a weak immune system).    Employers: This document serves as formal notice of your employee's medical guidelines for going back to work. They must meet the above guidelines before going back to work in person.    How can I take care of myself?    1. Get lots of rest. Drink extra fluids (unless a doctor has told you not to).    2. Take Tylenol (acetaminophen) for fever or pain. If you have liver or kidney problems, ask your family doctor if it's okay to take Tylenol.     Take either:     650 mg (two 325 mg pills) every 4 to 6 hours, or     1,000 mg (two 500 mg pills) every 8 hours as needed.     Note: Don't take more than 3,000 mg in one day. Acetaminophen is found in many medicines (both prescribed and over-the-counter medicines). Read all labels to be sure you don't take too much.    For children, check the Tylenol bottle for the right dose (based on their age or weight).    3. If you have other health problems (like cancer, heart failure, an organ transplant or severe kidney disease): Call your specialty clinic if you don't feel better in the next 2 days.    4. Know when to call 911: Emergency warning signs include:    Trouble breathing or shortness of breath    Pain or pressure in the chest that doesn't go away    Feeling confused like you haven't felt before, or  not being able to wake up    Bluish-colored lips or face    5. Sign up for [employer]. We know it's scary to hear that you have COVID-19. We want to track your symptoms to make sure you're okay over the next 2 weeks. Please look for an email from [employer]--this is a free, online program that we'll use to keep in touch. To sign up, follow the link in the email. Learn more at [email or web address].    Where can I get more information?    [facility]: [email or web address]    Coronavirus Basics: www.health.[facility]./diseases/coronavirus/basics.html    What to Do If You're Sick: www.cdc.gov/coronavirus/2019-ncov/about/steps-when-sick.html    Ending Home Isolation: www.cdc.gov/coronavirus/2019-ncov/hcp/disposition-in-home-patients.html     Caring for Someone with COVID-19: www.cdc.gov/coronavirus/2019-ncov/if-you-are-sick/care-for-someone.html     [facility] clinical trials (COVID-19 research studies): clinicalaffairs.[facility]-clinical-trials     A Positive COVID-19 letter will be sent via [employer] or the mail. (Exception, no letters sent to Presurgerical/Preprocedure Patients)    [Name]  Lizz Hernandez RN  [facility]  COVID19 Results Team RN  Ph# [phone]

## 2020-11-08 ENCOUNTER — HEALTH MAINTENANCE LETTER (OUTPATIENT)
Age: 40
End: 2020-11-08

## 2021-01-15 ENCOUNTER — HEALTH MAINTENANCE LETTER (OUTPATIENT)
Age: 41
End: 2021-01-15

## 2021-09-11 ENCOUNTER — HEALTH MAINTENANCE LETTER (OUTPATIENT)
Age: 41
End: 2021-09-11

## 2022-01-01 ENCOUNTER — HEALTH MAINTENANCE LETTER (OUTPATIENT)
Age: 42
End: 2022-01-01

## 2022-10-30 ENCOUNTER — HEALTH MAINTENANCE LETTER (OUTPATIENT)
Age: 42
End: 2022-10-30

## 2023-02-21 ENCOUNTER — HOSPITAL ENCOUNTER (OUTPATIENT)
Facility: CLINIC | Age: 43
Setting detail: OBSERVATION
Discharge: HOME | End: 2023-02-22
Attending: EMERGENCY MEDICINE | Admitting: EMERGENCY MEDICINE
Payer: COMMERCIAL

## 2023-02-21 ENCOUNTER — APPOINTMENT (OUTPATIENT)
Dept: CT IMAGING | Facility: CLINIC | Age: 43
End: 2023-02-21
Attending: EMERGENCY MEDICINE
Payer: COMMERCIAL

## 2023-02-21 DIAGNOSIS — K35.80 ACUTE APPENDICITIS, UNSPECIFIED ACUTE APPENDICITIS TYPE: Primary | ICD-10-CM

## 2023-02-21 LAB
ALBUMIN SERPL BCG-MCNC: 4.2 G/DL (ref 3.5–5.2)
ALP SERPL-CCNC: 45 U/L (ref 35–104)
ALT SERPL W P-5'-P-CCNC: 18 U/L (ref 10–35)
ANION GAP SERPL CALCULATED.3IONS-SCNC: 12 MMOL/L (ref 7–15)
AST SERPL W P-5'-P-CCNC: 21 U/L (ref 10–35)
BASOPHILS # BLD AUTO: 0.1 10E3/UL (ref 0–0.2)
BASOPHILS NFR BLD AUTO: 1 %
BILIRUB SERPL-MCNC: 0.9 MG/DL
BUN SERPL-MCNC: 11.6 MG/DL (ref 6–20)
CALCIUM SERPL-MCNC: 9.1 MG/DL (ref 8.6–10)
CHLORIDE SERPL-SCNC: 95 MMOL/L (ref 98–107)
CREAT SERPL-MCNC: 0.64 MG/DL (ref 0.51–0.95)
DEPRECATED HCO3 PLAS-SCNC: 25 MMOL/L (ref 22–29)
EOSINOPHIL # BLD AUTO: 0.2 10E3/UL (ref 0–0.7)
EOSINOPHIL NFR BLD AUTO: 2 %
ERYTHROCYTE [DISTWIDTH] IN BLOOD BY AUTOMATED COUNT: 11.9 % (ref 10–15)
GFR SERPL CREATININE-BSD FRML MDRD: >90 ML/MIN/1.73M2
GLUCOSE SERPL-MCNC: 97 MG/DL (ref 70–99)
HCG SERPL QL: NEGATIVE
HCT VFR BLD AUTO: 35.9 % (ref 35–47)
HGB BLD-MCNC: 12 G/DL (ref 11.7–15.7)
IMM GRANULOCYTES # BLD: 0 10E3/UL
IMM GRANULOCYTES NFR BLD: 0 %
LIPASE SERPL-CCNC: 22 U/L (ref 13–60)
LYMPHOCYTES # BLD AUTO: 2.6 10E3/UL (ref 0.8–5.3)
LYMPHOCYTES NFR BLD AUTO: 26 %
MCH RBC QN AUTO: 32.3 PG (ref 26.5–33)
MCHC RBC AUTO-ENTMCNC: 33.4 G/DL (ref 31.5–36.5)
MCV RBC AUTO: 97 FL (ref 78–100)
MONOCYTES # BLD AUTO: 0.8 10E3/UL (ref 0–1.3)
MONOCYTES NFR BLD AUTO: 8 %
NEUTROPHILS # BLD AUTO: 6.2 10E3/UL (ref 1.6–8.3)
NEUTROPHILS NFR BLD AUTO: 63 %
NRBC # BLD AUTO: 0 10E3/UL
NRBC BLD AUTO-RTO: 0 /100
PLATELET # BLD AUTO: 296 10E3/UL (ref 150–450)
POTASSIUM SERPL-SCNC: 3.6 MMOL/L (ref 3.4–5.3)
PROT SERPL-MCNC: 7.6 G/DL (ref 6.4–8.3)
RBC # BLD AUTO: 3.71 10E6/UL (ref 3.8–5.2)
SODIUM SERPL-SCNC: 132 MMOL/L (ref 136–145)
WBC # BLD AUTO: 9.8 10E3/UL (ref 4–11)

## 2023-02-21 PROCEDURE — 36415 COLL VENOUS BLD VENIPUNCTURE: CPT | Performed by: EMERGENCY MEDICINE

## 2023-02-21 PROCEDURE — 96365 THER/PROPH/DIAG IV INF INIT: CPT | Mod: 59

## 2023-02-21 PROCEDURE — 83690 ASSAY OF LIPASE: CPT | Performed by: EMERGENCY MEDICINE

## 2023-02-21 PROCEDURE — 250N000009 HC RX 250: Performed by: EMERGENCY MEDICINE

## 2023-02-21 PROCEDURE — 258N000003 HC RX IP 258 OP 636: Performed by: EMERGENCY MEDICINE

## 2023-02-21 PROCEDURE — 84703 CHORIONIC GONADOTROPIN ASSAY: CPT | Performed by: EMERGENCY MEDICINE

## 2023-02-21 PROCEDURE — 80053 COMPREHEN METABOLIC PANEL: CPT | Performed by: EMERGENCY MEDICINE

## 2023-02-21 PROCEDURE — 99204 OFFICE O/P NEW MOD 45 MIN: CPT | Mod: 57 | Performed by: SURGERY

## 2023-02-21 PROCEDURE — 99285 EMERGENCY DEPT VISIT HI MDM: CPT | Performed by: EMERGENCY MEDICINE

## 2023-02-21 PROCEDURE — 85025 COMPLETE CBC W/AUTO DIFF WBC: CPT | Performed by: EMERGENCY MEDICINE

## 2023-02-21 PROCEDURE — 99285 EMERGENCY DEPT VISIT HI MDM: CPT | Mod: 25 | Performed by: EMERGENCY MEDICINE

## 2023-02-21 PROCEDURE — 96374 THER/PROPH/DIAG INJ IV PUSH: CPT | Mod: 59 | Performed by: EMERGENCY MEDICINE

## 2023-02-21 PROCEDURE — 250N000011 HC RX IP 250 OP 636: Performed by: EMERGENCY MEDICINE

## 2023-02-21 PROCEDURE — 96376 TX/PRO/DX INJ SAME DRUG ADON: CPT

## 2023-02-21 PROCEDURE — 96375 TX/PRO/DX INJ NEW DRUG ADDON: CPT | Performed by: EMERGENCY MEDICINE

## 2023-02-21 PROCEDURE — G0378 HOSPITAL OBSERVATION PER HR: HCPCS

## 2023-02-21 PROCEDURE — 74177 CT ABD & PELVIS W/CONTRAST: CPT

## 2023-02-21 RX ORDER — NALOXONE HYDROCHLORIDE 0.4 MG/ML
0.2 INJECTION, SOLUTION INTRAMUSCULAR; INTRAVENOUS; SUBCUTANEOUS
Status: DISCONTINUED | OUTPATIENT
Start: 2023-02-21 | End: 2023-02-22 | Stop reason: HOSPADM

## 2023-02-21 RX ORDER — HYDROMORPHONE HYDROCHLORIDE 1 MG/ML
.3-.5 INJECTION, SOLUTION INTRAMUSCULAR; INTRAVENOUS; SUBCUTANEOUS EVERY 6 HOURS PRN
Status: DISCONTINUED | OUTPATIENT
Start: 2023-02-21 | End: 2023-02-22

## 2023-02-21 RX ORDER — SODIUM CHLORIDE 9 MG/ML
INJECTION, SOLUTION INTRAVENOUS CONTINUOUS
Status: DISCONTINUED | OUTPATIENT
Start: 2023-02-21 | End: 2023-02-22 | Stop reason: HOSPADM

## 2023-02-21 RX ORDER — NALOXONE HYDROCHLORIDE 0.4 MG/ML
0.4 INJECTION, SOLUTION INTRAMUSCULAR; INTRAVENOUS; SUBCUTANEOUS
Status: DISCONTINUED | OUTPATIENT
Start: 2023-02-21 | End: 2023-02-22 | Stop reason: HOSPADM

## 2023-02-21 RX ORDER — LISINOPRIL AND HYDROCHLOROTHIAZIDE 20; 25 MG/1; MG/1
1 TABLET ORAL DAILY
COMMUNITY

## 2023-02-21 RX ORDER — IOPAMIDOL 755 MG/ML
90 INJECTION, SOLUTION INTRAVASCULAR ONCE
Status: COMPLETED | OUTPATIENT
Start: 2023-02-21 | End: 2023-02-21

## 2023-02-21 RX ORDER — BUPROPION HYDROCHLORIDE 150 MG/1
150 TABLET ORAL EVERY MORNING
COMMUNITY

## 2023-02-21 RX ORDER — ONDANSETRON 2 MG/ML
4 INJECTION INTRAMUSCULAR; INTRAVENOUS EVERY 6 HOURS PRN
Status: DISCONTINUED | OUTPATIENT
Start: 2023-02-21 | End: 2023-02-22 | Stop reason: HOSPADM

## 2023-02-21 RX ORDER — ONDANSETRON 4 MG/1
4 TABLET, ORALLY DISINTEGRATING ORAL EVERY 6 HOURS PRN
Status: DISCONTINUED | OUTPATIENT
Start: 2023-02-21 | End: 2023-02-22 | Stop reason: HOSPADM

## 2023-02-21 RX ORDER — LAMOTRIGINE 100 MG/1
300 TABLET ORAL DAILY
COMMUNITY

## 2023-02-21 RX ORDER — HYDROMORPHONE HYDROCHLORIDE 1 MG/ML
0.3 INJECTION, SOLUTION INTRAMUSCULAR; INTRAVENOUS; SUBCUTANEOUS ONCE
Status: COMPLETED | OUTPATIENT
Start: 2023-02-21 | End: 2023-02-21

## 2023-02-21 RX ADMIN — SODIUM CHLORIDE: 9 INJECTION, SOLUTION INTRAVENOUS at 23:33

## 2023-02-21 RX ADMIN — SODIUM CHLORIDE 63 ML: 9 INJECTION, SOLUTION INTRAVENOUS at 21:23

## 2023-02-21 RX ADMIN — TAZOBACTAM SODIUM AND PIPERACILLIN SODIUM 3.38 G: 375; 3 INJECTION, SOLUTION INTRAVENOUS at 22:28

## 2023-02-21 RX ADMIN — IOPAMIDOL 90 ML: 755 INJECTION, SOLUTION INTRAVENOUS at 21:23

## 2023-02-21 RX ADMIN — HYDROMORPHONE HYDROCHLORIDE 0.5 MG: 1 INJECTION, SOLUTION INTRAMUSCULAR; INTRAVENOUS; SUBCUTANEOUS at 23:32

## 2023-02-21 RX ADMIN — HYDROMORPHONE HYDROCHLORIDE 0.3 MG: 1 INJECTION, SOLUTION INTRAMUSCULAR; INTRAVENOUS; SUBCUTANEOUS at 22:26

## 2023-02-21 ASSESSMENT — ACTIVITIES OF DAILY LIVING (ADL)
ADLS_ACUITY_SCORE: 35
ADLS_ACUITY_SCORE: 33

## 2023-02-21 NOTE — LETTER
"February 27, 2023      [place] [address]  [address] [facility] [address]        Dear ,    We are writing to inform you of your test results.  Path: shows appendicitis no cancer.  Please follow up with me to make sure you are doing well.   Please call [phone], for  [facility] or  for [facility], to schedule a follow up appointment in 2 weeks.       Resulted Orders   Surgical Pathology Exam   Result Value Ref Range    Case Report       Surgical Pathology Report                         Case: [id number]                                  Authorizing Provider:  Amish Croft MD Collected:           02/22/2023 06:40 AM          Ordering Location:     [facility]    Received:            02/22/2023 07:25 AM                                 Main OR                                                                      Pathologist:           Thony Nowak MD                                                           Specimen:    Appendix                                                                                   Final Diagnosis       Appendix: Laparoscopic appendectomy:  - Acute appendicitis and periappendicitis       Clinical Information       Procedure:  APPENDECTOMY, LAPAROSCOPIC  Pre-op Diagnosis: Acute appendicitis, unspecified acute appendicitis type [K35.80]  Post-op Diagnosis: K35.80 - Acute appendicitis, unspecified acute appendicitis type [ICD-10-CM]      Gross Description       A(1). Appendix, Appendix:  The specimen is received in formalin, labeled with the patient's name, medical record number and other identifying information designated \"appendix\". It consists of a 9.5 x 1.0 cm appendix stapled closed at the proximal margin.  The wall thickness averages 0.2 cm.  A definitive perforation is not identified.  Representative sections are submitted in 1 cassette to include proximal margin (inked black, en face).   (LEANN Fuller)      " Microscopic Description       The microscopic findings substantiates the final diagnosis.        Performing Labs       The technical component of this testing was completed at [facility]      Case Images         If you have any questions or concerns, please call the clinic at the number listed above.       Sincerely,      Amish Croft MD

## 2023-02-22 ENCOUNTER — ANESTHESIA EVENT (OUTPATIENT)
Dept: SURGERY | Facility: CLINIC | Age: 43
End: 2023-02-22
Payer: COMMERCIAL

## 2023-02-22 ENCOUNTER — ANESTHESIA (OUTPATIENT)
Dept: SURGERY | Facility: CLINIC | Age: 43
End: 2023-02-22
Payer: COMMERCIAL

## 2023-02-22 ENCOUNTER — TELEPHONE (OUTPATIENT)
Dept: SURGERY | Facility: CLINIC | Age: 43
End: 2023-02-22

## 2023-02-22 VITALS
BODY MASS INDEX: 30.53 KG/M2 | SYSTOLIC BLOOD PRESSURE: 120 MMHG | TEMPERATURE: 97.9 F | HEART RATE: 106 BPM | RESPIRATION RATE: 18 BRPM | WEIGHT: 190 LBS | HEIGHT: 66 IN | DIASTOLIC BLOOD PRESSURE: 77 MMHG | OXYGEN SATURATION: 91 %

## 2023-02-22 PROBLEM — I10 HTN (HYPERTENSION): Status: ACTIVE | Noted: 2017-07-06

## 2023-02-22 PROBLEM — S93.326A LISFRANC DISLOCATION: Status: ACTIVE | Noted: 2023-02-22

## 2023-02-22 PROBLEM — E03.9 HYPOTHYROIDISM: Status: ACTIVE | Noted: 2023-02-22

## 2023-02-22 PROBLEM — F32.A DEPRESSIVE DISORDER: Status: ACTIVE | Noted: 2023-02-22

## 2023-02-22 PROCEDURE — 250N000011 HC RX IP 250 OP 636: Performed by: NURSE ANESTHETIST, CERTIFIED REGISTERED

## 2023-02-22 PROCEDURE — 250N000013 HC RX MED GY IP 250 OP 250 PS 637: Performed by: NURSE ANESTHETIST, CERTIFIED REGISTERED

## 2023-02-22 PROCEDURE — 250N000013 HC RX MED GY IP 250 OP 250 PS 637: Performed by: FAMILY MEDICINE

## 2023-02-22 PROCEDURE — 250N000011 HC RX IP 250 OP 636: Performed by: FAMILY MEDICINE

## 2023-02-22 PROCEDURE — 44970 LAPAROSCOPY APPENDECTOMY: CPT | Performed by: SURGERY

## 2023-02-22 PROCEDURE — 250N000009 HC RX 250: Performed by: NURSE ANESTHETIST, CERTIFIED REGISTERED

## 2023-02-22 PROCEDURE — 88304 TISSUE EXAM BY PATHOLOGIST: CPT | Mod: TC | Performed by: SURGERY

## 2023-02-22 PROCEDURE — 88304 TISSUE EXAM BY PATHOLOGIST: CPT | Mod: 26 | Performed by: PATHOLOGY

## 2023-02-22 PROCEDURE — 96366 THER/PROPH/DIAG IV INF ADDON: CPT

## 2023-02-22 PROCEDURE — 370N000017 HC ANESTHESIA TECHNICAL FEE, PER MIN: Performed by: SURGERY

## 2023-02-22 PROCEDURE — 96376 TX/PRO/DX INJ SAME DRUG ADON: CPT

## 2023-02-22 PROCEDURE — 250N000025 HC SEVOFLURANE, PER MIN: Performed by: SURGERY

## 2023-02-22 PROCEDURE — 250N000009 HC RX 250: Performed by: SURGERY

## 2023-02-22 PROCEDURE — 710N000012 HC RECOVERY PHASE 2, PER MINUTE: Performed by: SURGERY

## 2023-02-22 PROCEDURE — 710N000009 HC RECOVERY PHASE 1, LEVEL 1, PER MIN: Performed by: SURGERY

## 2023-02-22 PROCEDURE — 272N000001 HC OR GENERAL SUPPLY STERILE: Performed by: SURGERY

## 2023-02-22 PROCEDURE — G0378 HOSPITAL OBSERVATION PER HR: HCPCS

## 2023-02-22 PROCEDURE — 271N000001 HC OR GENERAL SUPPLY NON-STERILE: Performed by: SURGERY

## 2023-02-22 PROCEDURE — 250N000011 HC RX IP 250 OP 636: Performed by: EMERGENCY MEDICINE

## 2023-02-22 PROCEDURE — 250N000013 HC RX MED GY IP 250 OP 250 PS 637: Performed by: SURGERY

## 2023-02-22 PROCEDURE — 360N000076 HC SURGERY LEVEL 3, PER MIN: Performed by: SURGERY

## 2023-02-22 RX ORDER — HYDROMORPHONE HCL IN WATER/PF 6 MG/30 ML
0.2 PATIENT CONTROLLED ANALGESIA SYRINGE INTRAVENOUS EVERY 5 MIN PRN
Status: DISCONTINUED | OUTPATIENT
Start: 2023-02-22 | End: 2023-02-22 | Stop reason: HOSPADM

## 2023-02-22 RX ORDER — CELECOXIB 200 MG/1
200 CAPSULE ORAL
Status: DISCONTINUED | OUTPATIENT
Start: 2023-02-22 | End: 2023-02-22 | Stop reason: HOSPADM

## 2023-02-22 RX ORDER — OXYCODONE HYDROCHLORIDE 5 MG/1
5 TABLET ORAL EVERY 4 HOURS PRN
Qty: 16 TABLET | Refills: 0 | Status: SHIPPED | OUTPATIENT
Start: 2023-02-22 | End: 2023-02-25

## 2023-02-22 RX ORDER — HYDROMORPHONE HCL IN WATER/PF 6 MG/30 ML
0.4 PATIENT CONTROLLED ANALGESIA SYRINGE INTRAVENOUS EVERY 5 MIN PRN
Status: DISCONTINUED | OUTPATIENT
Start: 2023-02-22 | End: 2023-02-22 | Stop reason: HOSPADM

## 2023-02-22 RX ORDER — OXYCODONE HYDROCHLORIDE 5 MG/1
5 TABLET ORAL EVERY 4 HOURS PRN
Status: DISCONTINUED | OUTPATIENT
Start: 2023-02-22 | End: 2023-02-22 | Stop reason: HOSPADM

## 2023-02-22 RX ORDER — MEPERIDINE HYDROCHLORIDE 25 MG/ML
12.5 INJECTION INTRAMUSCULAR; INTRAVENOUS; SUBCUTANEOUS EVERY 5 MIN PRN
Status: DISCONTINUED | OUTPATIENT
Start: 2023-02-22 | End: 2023-02-22 | Stop reason: HOSPADM

## 2023-02-22 RX ORDER — FENTANYL CITRATE 50 UG/ML
25 INJECTION, SOLUTION INTRAMUSCULAR; INTRAVENOUS EVERY 5 MIN PRN
Status: DISCONTINUED | OUTPATIENT
Start: 2023-02-22 | End: 2023-02-22 | Stop reason: HOSPADM

## 2023-02-22 RX ORDER — BUPIVACAINE HYDROCHLORIDE AND EPINEPHRINE 2.5; 5 MG/ML; UG/ML
INJECTION, SOLUTION EPIDURAL; INFILTRATION; INTRACAUDAL; PERINEURAL PRN
Status: DISCONTINUED | OUTPATIENT
Start: 2023-02-22 | End: 2023-02-22 | Stop reason: HOSPADM

## 2023-02-22 RX ORDER — ONDANSETRON 2 MG/ML
INJECTION INTRAMUSCULAR; INTRAVENOUS PRN
Status: DISCONTINUED | OUTPATIENT
Start: 2023-02-22 | End: 2023-02-22

## 2023-02-22 RX ORDER — CEFAZOLIN SODIUM/WATER 2 G/20 ML
2 SYRINGE (ML) INTRAVENOUS SEE ADMIN INSTRUCTIONS
Status: DISCONTINUED | OUTPATIENT
Start: 2023-02-22 | End: 2023-02-22 | Stop reason: HOSPADM

## 2023-02-22 RX ORDER — ACETAMINOPHEN 325 MG/1
975 TABLET ORAL ONCE
Status: DISCONTINUED | OUTPATIENT
Start: 2023-02-22 | End: 2023-02-22 | Stop reason: HOSPADM

## 2023-02-22 RX ORDER — FENTANYL CITRATE 50 UG/ML
50 INJECTION, SOLUTION INTRAMUSCULAR; INTRAVENOUS EVERY 5 MIN PRN
Status: DISCONTINUED | OUTPATIENT
Start: 2023-02-22 | End: 2023-02-22 | Stop reason: HOSPADM

## 2023-02-22 RX ORDER — CEFAZOLIN SODIUM/WATER 2 G/20 ML
2 SYRINGE (ML) INTRAVENOUS
Status: DISCONTINUED | OUTPATIENT
Start: 2023-02-22 | End: 2023-02-22 | Stop reason: HOSPADM

## 2023-02-22 RX ORDER — ONDANSETRON 2 MG/ML
4 INJECTION INTRAMUSCULAR; INTRAVENOUS EVERY 30 MIN PRN
Status: DISCONTINUED | OUTPATIENT
Start: 2023-02-22 | End: 2023-02-22 | Stop reason: HOSPADM

## 2023-02-22 RX ORDER — ONDANSETRON 4 MG/1
4 TABLET, ORALLY DISINTEGRATING ORAL EVERY 30 MIN PRN
Status: DISCONTINUED | OUTPATIENT
Start: 2023-02-22 | End: 2023-02-22 | Stop reason: HOSPADM

## 2023-02-22 RX ORDER — HYDROXYZINE HYDROCHLORIDE 25 MG/1
25 TABLET, FILM COATED ORAL EVERY 6 HOURS PRN
Status: DISCONTINUED | OUTPATIENT
Start: 2023-02-22 | End: 2023-02-22 | Stop reason: HOSPADM

## 2023-02-22 RX ORDER — LIDOCAINE HYDROCHLORIDE 10 MG/ML
INJECTION, SOLUTION INFILTRATION; PERINEURAL PRN
Status: DISCONTINUED | OUTPATIENT
Start: 2023-02-22 | End: 2023-02-22

## 2023-02-22 RX ORDER — HYDROXYZINE HYDROCHLORIDE 50 MG/1
50 TABLET, FILM COATED ORAL EVERY 6 HOURS PRN
Status: DISCONTINUED | OUTPATIENT
Start: 2023-02-22 | End: 2023-02-22 | Stop reason: HOSPADM

## 2023-02-22 RX ORDER — SODIUM CHLORIDE, SODIUM LACTATE, POTASSIUM CHLORIDE, CALCIUM CHLORIDE 600; 310; 30; 20 MG/100ML; MG/100ML; MG/100ML; MG/100ML
INJECTION, SOLUTION INTRAVENOUS CONTINUOUS
Status: DISCONTINUED | OUTPATIENT
Start: 2023-02-22 | End: 2023-02-22 | Stop reason: HOSPADM

## 2023-02-22 RX ORDER — FENTANYL CITRATE 50 UG/ML
INJECTION, SOLUTION INTRAMUSCULAR; INTRAVENOUS PRN
Status: DISCONTINUED | OUTPATIENT
Start: 2023-02-22 | End: 2023-02-22

## 2023-02-22 RX ORDER — METHOCARBAMOL 750 MG/1
750 TABLET, FILM COATED ORAL
Status: DISCONTINUED | OUTPATIENT
Start: 2023-02-22 | End: 2023-02-22 | Stop reason: HOSPADM

## 2023-02-22 RX ORDER — DEXAMETHASONE SODIUM PHOSPHATE 4 MG/ML
INJECTION, SOLUTION INTRA-ARTICULAR; INTRALESIONAL; INTRAMUSCULAR; INTRAVENOUS; SOFT TISSUE PRN
Status: DISCONTINUED | OUTPATIENT
Start: 2023-02-22 | End: 2023-02-22

## 2023-02-22 RX ORDER — OXYCODONE HYDROCHLORIDE 5 MG/1
5 TABLET ORAL
Status: COMPLETED | OUTPATIENT
Start: 2023-02-22 | End: 2023-02-22

## 2023-02-22 RX ORDER — KETOROLAC TROMETHAMINE 30 MG/ML
INJECTION, SOLUTION INTRAMUSCULAR; INTRAVENOUS PRN
Status: DISCONTINUED | OUTPATIENT
Start: 2023-02-22 | End: 2023-02-22

## 2023-02-22 RX ORDER — PROPOFOL 10 MG/ML
INJECTION, EMULSION INTRAVENOUS PRN
Status: DISCONTINUED | OUTPATIENT
Start: 2023-02-22 | End: 2023-02-22

## 2023-02-22 RX ORDER — HYDROMORPHONE HYDROCHLORIDE 1 MG/ML
.3-.5 INJECTION, SOLUTION INTRAMUSCULAR; INTRAVENOUS; SUBCUTANEOUS
Status: DISCONTINUED | OUTPATIENT
Start: 2023-02-22 | End: 2023-02-22 | Stop reason: HOSPADM

## 2023-02-22 RX ORDER — ACETAMINOPHEN 325 MG/1
650 TABLET ORAL EVERY 6 HOURS
Status: DISCONTINUED | OUTPATIENT
Start: 2023-02-22 | End: 2023-02-22 | Stop reason: HOSPADM

## 2023-02-22 RX ORDER — CEFAZOLIN SODIUM 1 G/3ML
INJECTION, POWDER, FOR SOLUTION INTRAMUSCULAR; INTRAVENOUS PRN
Status: DISCONTINUED | OUTPATIENT
Start: 2023-02-22 | End: 2023-02-22

## 2023-02-22 RX ORDER — GLYCOPYRROLATE 0.2 MG/ML
INJECTION, SOLUTION INTRAMUSCULAR; INTRAVENOUS PRN
Status: DISCONTINUED | OUTPATIENT
Start: 2023-02-22 | End: 2023-02-22

## 2023-02-22 RX ADMIN — HYDROMORPHONE HYDROCHLORIDE 0.5 MG: 1 INJECTION, SOLUTION INTRAMUSCULAR; INTRAVENOUS; SUBCUTANEOUS at 06:34

## 2023-02-22 RX ADMIN — ROCURONIUM BROMIDE 40 MG: 50 INJECTION, SOLUTION INTRAVENOUS at 06:22

## 2023-02-22 RX ADMIN — OXYCODONE HYDROCHLORIDE 5 MG: 5 TABLET ORAL at 03:39

## 2023-02-22 RX ADMIN — HYDROMORPHONE HYDROCHLORIDE 0.5 MG: 1 INJECTION, SOLUTION INTRAMUSCULAR; INTRAVENOUS; SUBCUTANEOUS at 07:14

## 2023-02-22 RX ADMIN — GLYCOPYRROLATE 0.2 MG: 0.2 INJECTION, SOLUTION INTRAMUSCULAR; INTRAVENOUS at 06:22

## 2023-02-22 RX ADMIN — HYDROMORPHONE HYDROCHLORIDE 0.5 MG: 1 INJECTION, SOLUTION INTRAMUSCULAR; INTRAVENOUS; SUBCUTANEOUS at 00:52

## 2023-02-22 RX ADMIN — SUGAMMADEX 200 MG: 100 INJECTION, SOLUTION INTRAVENOUS at 07:09

## 2023-02-22 RX ADMIN — TAZOBACTAM SODIUM AND PIPERACILLIN SODIUM 3.38 G: 375; 3 INJECTION, SOLUTION INTRAVENOUS at 03:39

## 2023-02-22 RX ADMIN — ACETAMINOPHEN 650 MG: 325 TABLET, FILM COATED ORAL at 03:39

## 2023-02-22 RX ADMIN — OXYCODONE HYDROCHLORIDE 5 MG: 5 TABLET ORAL at 08:41

## 2023-02-22 RX ADMIN — FENTANYL CITRATE 100 MCG: 50 INJECTION, SOLUTION INTRAMUSCULAR; INTRAVENOUS at 06:22

## 2023-02-22 RX ADMIN — HYDROXYZINE HYDROCHLORIDE 50 MG: 50 TABLET, FILM COATED ORAL at 08:30

## 2023-02-22 RX ADMIN — CEFAZOLIN 2 G: 1 INJECTION, POWDER, FOR SOLUTION INTRAMUSCULAR; INTRAVENOUS at 06:36

## 2023-02-22 RX ADMIN — MIDAZOLAM 2 MG: 1 INJECTION INTRAMUSCULAR; INTRAVENOUS at 06:17

## 2023-02-22 RX ADMIN — LIDOCAINE HYDROCHLORIDE 50 MG: 10 INJECTION, SOLUTION INFILTRATION; PERINEURAL at 06:22

## 2023-02-22 RX ADMIN — KETOROLAC TROMETHAMINE 30 MG: 30 INJECTION, SOLUTION INTRAMUSCULAR at 07:16

## 2023-02-22 RX ADMIN — ONDANSETRON 4 MG: 2 INJECTION INTRAMUSCULAR; INTRAVENOUS at 07:04

## 2023-02-22 RX ADMIN — HYDROMORPHONE HYDROCHLORIDE 0.5 MG: 1 INJECTION, SOLUTION INTRAMUSCULAR; INTRAVENOUS; SUBCUTANEOUS at 03:38

## 2023-02-22 RX ADMIN — PROPOFOL 150 MG: 10 INJECTION, EMULSION INTRAVENOUS at 06:22

## 2023-02-22 RX ADMIN — DEXAMETHASONE SODIUM PHOSPHATE 8 MG: 4 INJECTION, SOLUTION INTRA-ARTICULAR; INTRALESIONAL; INTRAMUSCULAR; INTRAVENOUS; SOFT TISSUE at 06:22

## 2023-02-22 ASSESSMENT — ACTIVITIES OF DAILY LIVING (ADL)
ADLS_ACUITY_SCORE: 35

## 2023-02-22 ASSESSMENT — LIFESTYLE VARIABLES: TOBACCO_USE: 1

## 2023-02-22 NOTE — TELEPHONE ENCOUNTER
Type of surgery: APPENDECTOMY, LAPAROSCOPIC possible open (N/A)     Location of surgery: [place] OR  Date and time of surgery: 2/22  Surgeon: Lang   Pre-Op Appt Date: emergent   Post-Op Appt Date: will  call   Packet sent out: Not Applicable  Pre-cert/Authorization completed:    Date:

## 2023-02-22 NOTE — OP NOTE
Carmel Armijo     Date of Service: 2/22/2023     Surgeon:  Amish Croft MD     PREOPERATIVE DIAGNOSIS:   appendicitis     POSTOPERATIVE DIAGNOSIS:   Same not ruptured no gangrene but very inflaMED APPENDIX     PROCEDURE PERFORMED:  Laparoscopic appendectomy     ANESTHESIA:  General anesthetic with 0.25%   Marcaine with epinephrine injected to sites prior to and at the end of the procedure.     ESTIMATED BLOOD LOSS:  Less than 10   mL.       INDICATIONS:  The patient is a 42 year old  female who started having abdominal pain and eventually it was right lower quadrant.  It just was not getting better.  CT scan shows appendicitis.  White count is normal.   The patient  is  definitely tender in the right lower quadrant.  It was felt that a laparoscopic appendectomy, possible open, was in the patient's best interest.  We discussed risks and complications including bleeding, infection,  damage to bowel, hernias, possible conversion to open procedure,  especially with adhesions and what to expect afterwards.The patient understands and would like to proceed.     DESCRIPTION OF PROCEDURE:  The patient was brought to the OR, placed in the supine position.  After receiving general anesthesia, the abdomen was prepped and draped in a sterile manner.  Incision was made in the infraumbilical site with a knife after infiltrating the area with Marcaine.  A 5 mm trocar was placed to this site with a camera going through it, entering the abdomen without difficulty.  The abdomen was insufflated to 15 mmHg and then  a 10-12 trocar was placed in the left lower quadrant and another 5 mm trocar was placed suprapubic just off the midline.  I was able to get a hold of the cecum and looked at the base of the appendix.  I was able to create a space between the mesentery and the appendix and a blueload FAITH stapler was placed across and fired.  Then the appendiceal  mesentery was taking down with 1  white load stapling, taking  great care not to involve the  bowel.  After this was done, we had it freed up.  We placed an EndoCatch in and took it out through the 10-12 trocar site in the left side of the abdomen, with out having to dilate the tract a little bit with a clamp.  There was a little bit of bleeding when we first took off the larger part of the mesentery with the stapler.  Good hemostasis was achieved.      I irrigated the area multiple times, irrigated the rest of the abdomen as needed, and suctioned up as much of the fluid as possible. I  looked at the area multiple times and again, there was no evidence of any more bleeding.  The 10-12 trocar fascia was closed with an 0 Vicryl suture x 1  using the Lewis-Jeffery equipment.  As much CO2 was removed as possible before removing  the trocars and then the 10-12 trocar subcutaneous fat was brought together with several interrupted 3-0 Vicryl sutures and the skin was closed at all sites with 4-0 Monocryl, covered with tincture of benzoin, Steri-Strips and Tegaderm, along with an abdominal binder.  The patient did receive antibiotics preoperatively.     The plan is to  discharge the patient  from the PACU and to follow up with me in 2 weeks in clinic..   The patient did receive antibiotics prior to the procedure.   Needle and instrument count was correct.            Amish Croft MD .

## 2023-02-22 NOTE — DISCHARGE INSTRUCTIONS
APPENDECTOMY DISCHARGE INSTRUCTIONS  DR. MORIAH GARY    Please call [phone], for  [facility] or  for [facility], to schedule a follow up appointment in 2 weeks.       1. You may resume your regular diet when you feel you are ready to. DO NOT drink alcoholic beverages for 24 hours of while you are taking prescription medication.    2. Limit your activities for the first 48 hours. Gradually, increase them as tolerated. You may use stairs. I encourage you to walk as tolerated. No lifting greater that 20 pounds for 3 weeks.    3. You will have some discomfort at the incision sites. This is expected. This should improve over the next 2-3 days. Ice and pain medication will help with this pain. Use prescribed pain medication as instructed.    4. Bruising and mild swelling is normal after surgery. The area below and around the incision(s) will be hard and elevated. This is normal. I call it the healing ridge. This will resolve slowly over the next several months. If you feel the pain is increasing and cannot explain it by increasing activity please call us at [phone].    5. The dressing will often have some blood on it. You may shower 24 to 48 hours after surgery. Clean gently over incision site. If clear plastic covering or steri-strip comes off and there is still some bleeding or drainage then cover with gauze or band-aid. If no bleeding, there is no reason to cover site. The abdominal binder may be removed after 24 hours after surgery. You may continue to wear it however for comfort. I suggest  you wear an old t-shirt under the abdominal binder for a more comfortable wear.    6. Avoid Aspirin for the first 72 hours after the procedure. This medication may increase the tendency to bleed.    7. Use the following medications (in addition to your normal meds) as shown:  a. Percocet 5 mg 1-2 every 6 hours as needed for pain. This contains 500 mg of Tylenol (acetaminophen)  per tablet. Please do not take more than 4 grams of Tylenol (acetaminophen) per day. For example, you may take 1 Percocet and 1 Tylenol, or 2 Percocet and no Tylenol, or 2 Tylenol and no Percocet every 6 hours.  b. Tylenol (acetaminophen) 500 mg every 6 hours as needed for pain. Do not take more than 1000 mg every 6 hours. (see above)  c. Motrin (ibuprofen) 200-800 mg every 6 hours as needed for pain. Take with food.    8. Notify Dr. Croft s clinic at [phone] if:  Your discomfort is not relieved by your pain medication.  You have signs of infection such as temperature above 100.5 degrees orally, chills, or increasing daily discomfort.  Incision site is becoming more red and/or there is purulent drainage.  You have questions or concerns.    9. Please call [phone]  for  [facility] of  for [facility], to schedule a follow up appointment in 2 weeks.    10. When taking narcotics (pain medication more than Tylenol [acetaminophen] and Motrin [ibuprofen]) it is important to keep your stools soft to avoid constipation and pain with straining. This is best done by drinking fluids (non-alcoholic and non-caffeinated) and taking a stool softener (i.e. Metamucil or milk of magnesia). You may be able to use non-narcotics for pain relief especially by the 3rd post- operative day. Tylenol 500 mg  every 6 hours and/or Motrin (ibuprofen) 200-800 mg every 6 hours. Please do not take more than 4 grams of Tylenol (acetaminophen) per day. Remember your Percocet does have Tylenol (acetaminophen) already in it. Please take Motrin (ibuprofen) with food to help protect the stomach. If you have a history of stomach ulcers or stomach problems, do not take Motrin (ibuprofen).    11. Do not drive or operate heavy machinery for 24 hours after surgery or when taking narcotics. You may resume driving when feel that you can safely avoid an accident and are not taking narcotics. This is usually 5  to 7 days after surgery. You should not be alone for 24 hours after surgery.    12.  If you have questions after your procedure/surgery please contact Dr Croft's primary clinic in [place]. You can call [phone], your other option is to send us a [employer] message through your [employer] portal to Dr Croft's team. For urgent and non urgent matters these options are best. If your symptoms are emergent or cannot wait, please proceed to [facility] and let them know who you had surgery with.    Patient can receive pain medications that I have ordered and give the first dose in the recovery room as needed.                              Same Day Surgery Discharge Instructions  Special Precautions After Surgery - Adult    It is not unusual to feel lightheaded or faint, up to 24 hours after surgery or while taking pain medication.  If you have these symptoms; sit for a few minutes before standing and have someone assist you when getting up.  You should rest and relax for the next 24 hours and must have someone stay with you for at least 24 hours after your discharge.  DO NOT DRIVE any vehicle or operate mechanical equipment for 24 hours following the end of your surgery.  DO NOT DRIVE while taking narcotic pain medications that have been prescribed by your physician.  If you had a limb operated on, you must be able to use it fully to drive.  DO NOT drink alcoholic beverages for 24 hours following surgery or while taking prescription pain medication.  Drink clear liquids (apple juice, ginger ale, broth, 7-Up, etc.).  Progress to your regular diet as you feel able.  Any questions call your physician and do not make important decisions for 24 hours.    Nausea and Vomiting: Nausea and vomiting can occur any time after receiving anesthesia. If you experience nausea and vomiting we encourage you to move to a clear liquid diet and advance your diet as tolerated. If nausea and vomiting do not improve within 12  hours please call the surgeon or present to the Emergency department.     Break-through Bleeding: If your experience bleeding from your surgical site apply pressure and additional dressing per nurse instruction. For simple problems such as a saturated dressing, you may need to reinforce the dressing with more gauze and tape and put slight pressure on the site. If bleeding does not subside contact the surgeon or present to the Emergency Department.    Post-op Infection: If you develop a fever of 100.4 or greater, have pus like drainage, redness, swelling or severe pain at the surgical site not alleviated with pain medications; please contact the surgeon or present to the Emergency Department.   __________________________________________________________________________________________________________________________________  IMPORTANT NUMBERS:    [facility] [place]:  [phone], [phone]  Pharmacy:  [phone]  Same Day Surgery:  [phone], for general post-op questions call Monday - Thursday until 8:30 p.m., Fridays until 6:00 p.m.  Urgent Care:  [phone]  Nurse Advice Line:  [phone]                                                                         Surgery Specialty Clinic:  [phone]

## 2023-02-22 NOTE — CONSULTS
Dear Dr. Murillo  I have seen Guadalupeiris Terry and as you know his chief complaint is  right lower quadrant pain.   That started as periumbilical pain    HPI:  Patient is a 42 year old female  with complaints started yesterday and got worse    Review Of Systems  Hydrocodone and No clinical screening - see comments  Respiratory: No shortness of breath, dyspnea on exertion, cough, or hemoptysis  Cardiovascular: negative  Gastrointestinal: negative  Endocrine: negative  Bladder outlet obstructing symptoms No      Past Medical History:   Diagnosis Date     Depressive disorder        No past surgical history on file.    Social History     Socioeconomic History     Marital status: Single     Spouse name: Not on file     Number of children: Not on file     Years of education: Not on file     Highest education level: Not on file   Occupational History     Not on file   Tobacco Use     Smoking status: Every Day     Types: Other     Smokeless tobacco: Never   Substance and Sexual Activity     Alcohol use: Yes     Comment: daily was a bottle per day could go through, since recent detox 2 drinks at night     Drug use: No     Sexual activity: Not on file   Other Topics Concern     Parent/sibling w/ CABG, MI or angioplasty before 65F 55M? Not Asked   Social History Narrative     Not on file     Social Determinants of Health     Financial Resource Strain: Not on file   Food Insecurity: Not on file   Transportation Needs: Not on file   Physical Activity: Not on file   Stress: Not on file   Social Connections: Not on file   Intimate Partner Violence: Not on file   Housing Stability: Not on file        Current Outpatient Medications   Medication Sig Dispense Refill     buPROPion (WELLBUTRIN XL) 150 MG 24 hr tablet Take 150 mg by mouth every morning       lamoTRIgine (LAMICTAL) 100 MG tablet Take 300 mg by mouth daily       lisinopril-hydrochlorothiazide (ZESTORETIC) 20-25 MG tablet Take 1 tablet by mouth daily       Cholecalciferol  (VITAMIN D3) 1000 UNITS CAPS Take 1,000 Units by mouth       fish oil-omega-3 fatty acids 1000 MG capsule        fluticasone (FLONASE) 50 MCG/ACT spray Spray 2 sprays into both nostrils daily 1 Bottle 11     levothyroxine (SYNTHROID/LEVOTHROID) 25 MCG tablet Take 25 mcg by mouth       Multiple Vitamin (MULTI-VITAMINS) TABS Take 1 tablet by mouth       norgestim-eth estrad triphasic (ORTHO TRI-CYCLEN, TRI-SPRINTEC) 0.18/0.215/0.25 MG-35 MCG per tablet Take 1 tablet by mouth       venlafaxine (EFFEXOR-XR) 150 MG 24 hr capsule Take 150 mg by mouth       venlafaxine (EFFEXOR-XR) 75 MG 24 hr capsule 1 capsule daily With 150 mg to make 225 mg daily  2       Physical exam:  Patient able to sit up with out much difficulty.  Head eyes, nose and mouth within normal limits.  No supraclavicular or cervical adenopathy palpated.  Thyroid within normal limits.  No carotid bruits auscultated.  Lungs are clear to auscultation  Heart is regular rate and rhythm with no murmur or thrills noted.  No costal vertebral angle tenderness noted.  Abdomen is abdomen is soft without significant tenderness, except at the right lower quadrant but is better as she has had some pain medications.  No masses, organomegaly or guarding  bowel sounds are positive and no caput medusa noted.    Lower extremity edema is not present.        Most recent labs:   Admission on 10/21/2020, Discharged on 10/21/2020   Component Date Value Ref Range Status     COVID-19 Virus PCR to [facility] - So* 10/21/2020 Nasopharyngeal   Final     COVID-19 Virus PCR to [facility] - Re* 10/21/2020 Detected, Abnormal Result (AA)   Final    Comment: Positive for 2019-nCoV.  Patient sample was heat inactivated and amplified using the HDPCR SARS-CoV-2   assay ([employer] The HDPCRTM SARS-CoV-2 assay is a reverse   transcription real-time polymerase chain reaction (qRT-PCR) test intended for   the qualitative detection of nucleic acid  from SARS-CoV-2 in human nasopharyngeal swabs,  oropharyngeal swabs, anterior   nasal swabs, mid-turbinate nasal swabs as well as nasal aspirate, nasal wash,   and bronchoalveolar lavage (BAL) specimens from individuals who are suspected   of COVID-19 by their healthcare provider.  Positive results should also be reported in accordance with local, state, and   federal regulations.  Nasopharyngeal specimen is the preferred choice for swab-based SARS CoV2   testing. When collection of a nasopharyngeal swab is not possible the   following are acceptable alternatives:  an oropharyngeal (OP) specimen collected by a healthcare professional, or a   nasal mid-turbinate (NMT) swab collected by a healthcar                           e professional or by   onsite self-collection (using a flocked tapered swab), or an anterior nares   specimen collected by a healthcare professional or by onsite self-collection   (using a round foam swab). (Cent[employer] Disease Control)  Testing performed by [facility] ([facility]) [address] [facility] [address]  The test performance characteristics were determined by [facility]. It has not been   cleared or approved by the FDA.  The laboratory is regulated under the Clinical Laboratory Improvement   Amendments of 1988 (CLIA-88) as qualified to perform high-complexity testing.   This test is used for clinical purposes. It should not be regarded as   investigational or for research.        0 Result Notes    Component Ref Range & Units  7:33 PM 4 yr ago 6 yr ago 17 yr ago    Sodium 136 - 145 mmol/L 132 Low   136 R  134 R      Potassium 3.4 - 5.3 mmol/L 3.6        Chloride 98 - 107 mmol/L 95 Low         Carbon Dioxide (CO2) 22 - 29 mmol/L 25        Anion Gap 7 - 15 mmol/L 12  12 R  7 R      Urea Nitrogen 6.0 - 20.0 mg/dL 11.6        Creatinine 0.51 - 0.95 mg/dL 0.64  0.52 R  0.58 R      Calcium 8.6 - 10.0 mg/dL 9.1  9.2 R  9.2 R      Glucose 70 - 99 mg/dL 97        Alkaline  Phosphatase 35 - 104 U/L 45        AST 10 - 35 U/L 21  126 High  R  177 High  R  38 R     ALT 10 - 35 U/L 18  146 High  R  165 High  R  14 R     Protein Total 6.4 - 8.3 g/dL 7.6  8.9 High  R  8.0 R  7.8 R     Albumin 3.5 - 5.2 g/dL 4.2        Bilirubin Total <=1.2 mg/dL 0.9  0.6 R  0.9 R  0.9 R     GFR Estimate              0 Result Notes    Component Ref Range & Units  7:33 PM   (2/21/23) 4 yr ago   (5/28/18) 6 yr ago   (7/16/16) 17 yr ago   (8/17/05) 19 yr ago   (9/28/03) 19 yr ago   (9/28/03)    WBC Count 4.0 - 11.0 10e3/uL 9.8  6.3 R  5.2 R  5.4 R       RBC Count 3.80 - 5.20 10e6/uL 3.71 Low   3.89 R  3.75 Low  R  4.16 R       Hemoglobin 11.7 - 15.7 g/dL 12.0  13.5  12.7  13.1  9.8 Low       Hematocrit 35.0 - 47.0 % 35.9  39.3  37.8  39.3   30.8 Low      MCV 78 - 100 fL 97  101 High  R  101 High  R  95 R       MCH 26.5 - 33.0 pg 32.3  34.7 High   33.9 High   31.5       MCHC 31.5 - 36.5 g/dL 33.4  34.4  33.6  33.4 R       RDW 10.0 - 15.0 % 11.9  11.4  11.6  12.1       Platelet Count 150 - 450 10e3/uL 296  238 R  227 R  202 R       % Neutrophils % 63  43.5  62.0  45 R       % Lymphocytes % 26  38.9  21.9  44 R       % Monocytes % 8  13.6  14.1  7 R       % Eosinophils % 2  2.4  0.6  2 R       % Basophils % 1  1.4  1.2  2 R       % Immature Granulocytes % 0          NRBCs per 100 WBC <1 /100 0          Absolute Neutrophils 1.6 - 8.3 10e3/uL 6.2          Absolute Lymphocytes 0.8 - 5.3 10e3/uL 2.6          Absolute Monocytes 0.0 - 1.3 10e3/uL 0.8          Absolute Eosinophils 0.0 - 0.7 10e3/uL 0.2          Absolute Basophils 0.0 - 0.2 10e3/uL 0.1          Absolute Immature Granulocytes <=0.4 10e3/uL 0.0          Absolute NRBCs 10e3/uL 0.0         Resulting Agency                Component Ref Range & Units  7:33 PM     Lipase 13 - 60 U/L 22    Resulting         Notes   Component Ref Range & Units  8:36 PM     hCG Serum Qualitative Negative Negative    Comment: This test is for screening purposes.  Results should be  interpreted along with the clinical picture.  Confirmation testing is available if warranted by ordering [id number], HCG Quantitative Pregnancy.   Resulting           Study Result    Narrative & Impression   EXAM: CT ABDOMEN PELVIS W CONTRAST  LOCATION: [facility]  DATE/TIME: 2/21/2023 9:33 PM     INDICATION: Right lower quadrant abdominal pain rule out appendicitis  COMPARISON: None.  TECHNIQUE: CT scan of the abdomen and pelvis was performed following injection of IV contrast. Multiplanar reformats were obtained. Dose reduction techniques were used.  CONTRAST: 63 mL Isovue 370     FINDINGS:   LOWER CHEST: Normal.     HEPATOBILIARY: Normal.     PANCREAS: Normal.     SPLEEN: Normal.     ADRENAL GLANDS: Normal.     KIDNEYS/BLADDER: Normal.     BOWEL: Dilated fluid-filled appendix measuring up to 10 mm with surrounding. Appendiceal inflammatory stranding, findings consistent with acute appendicitis. No periappendiceal abscess or free intraperitoneal gas. Small volume free fluid tracking into   the dependent pelvis. No bowel obstruction.     LYMPH NODES: Normal.     VASCULATURE: Unremarkable.     PELVIC ORGANS: Normal.     MUSCULOSKELETAL: Breast implants. Umbilical ornamentation. Tiny fat-containing umbilical hernia. Mild multilevel degenerative changes of the lumbar spine. No acute osseous abnormality.                                                                      IMPRESSION:   1.  Acute uncomplicated appendicitis. Small volume free fluid tracking into the dependent pelvis. No periappendiceal abscess         Assessment:      Discussed laparoscopic possible open appendectomy, is the best option.    Risks of surgery include damage to nerves, bleeding, infection, damage to  Vessels, damage to bowel,  Hernias and possible open. And risk of  General anesthesia .   Consent form signed.  Plan is laparoscopic appendectomy.  I did discuss what to expect after surgery and discussed no lifting  greater than 20 pounds for 3 to 6 weeks.  Questions were answered and explanation of the surgery was done.    Time spent with the patient with greater that 50% of the time in discussion was 45 minutes.  In discussing the plan with patient talking to emergency room doc last night and looking at her ct scan and labs. .      Amish Croft MD    Sincerely   Amish Croft MD    Consent form signed.

## 2023-02-22 NOTE — ANESTHESIA PROCEDURE NOTES
Airway       Patient location during procedure: OR       Procedure Start/Stop Times: 2/22/2023 6:24 AM  Staff -        CRNA: Homa Gaming APRN CRNA       Performed By: CRNA  Consent for Airway        Urgency: elective  Indications and Patient Condition       Indications for airway management: diya-procedural and airway protection         Mask difficulty assessment: 1 - vent by mask    Final Airway Details       Final airway type: endotracheal airway       Successful airway: ETT - single  Endotracheal Airway Details        ETT size (mm): 7.0       Cuffed: yes       Successful intubation technique: video laryngoscopy       VL Blade Size: Doss 3       Grade View of Cords: 1       Adjucts: stylet       Position: Right       Measured from: gums/teeth       Secured at (cm): 22    Post intubation assessment        Placement verified by: capnometry, equal breath sounds and chest rise        Number of attempts at approach: 1       Number of other approaches attempted: 0       Secured with: silk tape       Ease of procedure: easy       Dentition: Intact and Unchanged    Medication(s) Administered   Medication Administration Time: 2/22/2023 6:24 AM

## 2023-02-22 NOTE — ANESTHESIA POSTPROCEDURE EVALUATION
Patient: Guadalupe Terry    Procedure: Procedure(s):  APPENDECTOMY, LAPAROSCOPIC       Anesthesia Type:  General    Note:  Disposition: Outpatient   Postop Pain Control: Uneventful            Sign Out: Well controlled pain   PONV: No   Neuro/Psych: Uneventful            Sign Out: Acceptable/Baseline neuro status   Airway/Respiratory: Uneventful            Sign Out: Acceptable/Baseline resp. status   CV/Hemodynamics: Uneventful            Sign Out: Acceptable CV status; No obvious hypovolemia; No obvious fluid overload   Other NRE: NONE   DID A NON-ROUTINE EVENT OCCUR? No           Last vitals:  Vitals Value Taken Time   /80 02/22/23 0815   Temp 37  C (98.6  F) 02/22/23 0815   Pulse 105 02/22/23 0817   Resp 16 02/22/23 0817   SpO2 95 % 02/22/23 0817   Vitals shown include unvalidated device data.    Electronically Signed By: SATISH Das CRNA  February 22, 2023  8:30 AM

## 2023-02-22 NOTE — ED TRIAGE NOTES
Abdominal pain with sharpness to RLQ. Passing gas and had BM today. Intermittent nausea. No bowel or bladder concerns. Attempted to take Gas X due to bloating, but this did not help.      Triage Assessment     Row Name 02/21/23 1912       Triage Assessment (Adult)    Airway WDL WDL       Respiratory WDL    Respiratory WDL WDL       Skin Circulation/Temperature WDL    Skin Circulation/Temperature WDL WDL       Cardiac WDL    Cardiac WDL WDL       Peripheral/Neurovascular WDL    Peripheral Neurovascular WDL WDL       Cognitive/Neuro/Behavioral WDL    Cognitive/Neuro/Behavioral WDL WDL

## 2023-02-22 NOTE — ED PROVIDER NOTES
"     Emergency Department Patient Sign-out       Brief HPI:  This is a 42 year old female signed out to me by Dr. Murillo .  See initial ED Provider note for details of the presentation.     appendicitis. non-ruptured.  uncomplicated.  stable.      Significant Events prior to my assuming care:       Exam:   Patient Vitals for the past 24 hrs:   BP Temp Temp src Pulse Resp SpO2 Height Weight   02/21/23 2300 125/69 -- -- 97 -- 98 % -- --   02/21/23 1910 (!) 141/90 97.9  F (36.6  C) Oral 89 20 -- 1.664 m (5' 5.5\") 86.2 kg (190 lb)   02/21/23 1852 (!) 145/88 98.1  F (36.7  C) Oral 91 -- 98 % -- --           ED RESULTS:   Results for orders placed or performed during the hospital encounter of 02/21/23 (from the past 24 hour(s))   Comprehensive metabolic panel     Status: Abnormal    Collection Time: 02/21/23  7:33 PM   Result Value Ref Range    Sodium 132 (L) 136 - 145 mmol/L    Potassium 3.6 3.4 - 5.3 mmol/L    Chloride 95 (L) 98 - 107 mmol/L    Carbon Dioxide (CO2) 25 22 - 29 mmol/L    Anion Gap 12 7 - 15 mmol/L    Urea Nitrogen 11.6 6.0 - 20.0 mg/dL    Creatinine 0.64 0.51 - 0.95 mg/dL    Calcium 9.1 8.6 - 10.0 mg/dL    Glucose 97 70 - 99 mg/dL    Alkaline Phosphatase 45 35 - 104 U/L    AST 21 10 - 35 U/L    ALT 18 10 - 35 U/L    Protein Total 7.6 6.4 - 8.3 g/dL    Albumin 4.2 3.5 - 5.2 g/dL    Bilirubin Total 0.9 <=1.2 mg/dL    GFR Estimate >90 >60 mL/min/1.73m2   CBC with platelets, differential     Status: Abnormal    Collection Time: 02/21/23  7:33 PM    Narrative    The following orders were created for panel order CBC with platelets, differential.  Procedure                               Abnormality         Status                     ---------                               -----------         ------                     CBC with platelets and d...[[id number]]  Abnormal            Final result                 Please view results for these tests on the individual orders.   Lipase     Status: Normal    Collection Time: " 02/21/23  7:33 PM   Result Value Ref Range    Lipase 22 13 - 60 U/L   CBC with platelets and differential     Status: Abnormal    Collection Time: 02/21/23  7:33 PM   Result Value Ref Range    WBC Count 9.8 4.0 - 11.0 10e3/uL    RBC Count 3.71 (L) 3.80 - 5.20 10e6/uL    Hemoglobin 12.0 11.7 - 15.7 g/dL    Hematocrit 35.9 35.0 - 47.0 %    MCV 97 78 - 100 fL    MCH 32.3 26.5 - 33.0 pg    MCHC 33.4 31.5 - 36.5 g/dL    RDW 11.9 10.0 - 15.0 %    Platelet Count 296 150 - 450 10e3/uL    % Neutrophils 63 %    % Lymphocytes 26 %    % Monocytes 8 %    % Eosinophils 2 %    % Basophils 1 %    % Immature Granulocytes 0 %    NRBCs per 100 WBC 0 <1 /100    Absolute Neutrophils 6.2 1.6 - 8.3 10e3/uL    Absolute Lymphocytes 2.6 0.8 - 5.3 10e3/uL    Absolute Monocytes 0.8 0.0 - 1.3 10e3/uL    Absolute Eosinophils 0.2 0.0 - 0.7 10e3/uL    Absolute Basophils 0.1 0.0 - 0.2 10e3/uL    Absolute Immature Granulocytes 0.0 <=0.4 10e3/uL    Absolute NRBCs 0.0 10e3/uL   HCG qualitative pregnancy (blood)     Status: Normal    Collection Time: 02/21/23  8:36 PM   Result Value Ref Range    hCG Serum Qualitative Negative Negative   CT Abdomen Pelvis w Contrast     Status: None    Collection Time: 02/21/23  9:33 PM    Narrative    EXAM: CT ABDOMEN PELVIS W CONTRAST  LOCATION: [facility]  DATE/TIME: 2/21/2023 9:33 PM    INDICATION: Right lower quadrant abdominal pain rule out appendicitis  COMPARISON: None.  TECHNIQUE: CT scan of the abdomen and pelvis was performed following injection of IV contrast. Multiplanar reformats were obtained. Dose reduction techniques were used.  CONTRAST: 63 mL Isovue 370    FINDINGS:   LOWER CHEST: Normal.    HEPATOBILIARY: Normal.    PANCREAS: Normal.    SPLEEN: Normal.    ADRENAL GLANDS: Normal.    KIDNEYS/BLADDER: Normal.    BOWEL: Dilated fluid-filled appendix measuring up to 10 mm with surrounding. Appendiceal inflammatory stranding, findings consistent with acute appendicitis. No  periappendiceal abscess or free intraperitoneal gas. Small volume free fluid tracking into   the dependent pelvis. No bowel obstruction.    LYMPH NODES: Normal.    VASCULATURE: Unremarkable.    PELVIC ORGANS: Normal.    MUSCULOSKELETAL: Breast implants. Umbilical ornamentation. Tiny fat-containing umbilical hernia. Mild multilevel degenerative changes of the lumbar spine. No acute osseous abnormality.      Impression    IMPRESSION:   1.  Acute uncomplicated appendicitis. Small volume free fluid tracking into the dependent pelvis. No periappendiceal abscess.       ED MEDICATIONS:   Medications   piperacillin-tazobactam (ZOSYN) infusion 3.375 g (3.375 g Intravenous New Bag 2/21/23 2228)   sodium chloride 0.9% infusion ( Intravenous New Bag 2/21/23 2333)   naloxone (NARCAN) injection 0.2 mg (has no administration in time range)     Or   naloxone (NARCAN) injection 0.4 mg (has no administration in time range)     Or   naloxone (NARCAN) injection 0.2 mg (has no administration in time range)     Or   naloxone (NARCAN) injection 0.4 mg (has no administration in time range)   HYDROmorphone (PF) (DILAUDID) injection 0.3-0.5 mg (0.5 mg Intravenous Given 2/21/23 2332)   ondansetron (ZOFRAN ODT) ODT tab 4 mg (has no administration in time range)     Or   ondansetron (ZOFRAN) injection 4 mg (has no administration in time range)   sodium chloride 0.9 % bag 500mL for CT scan flush use (63 mLs Intravenous Given 2/21/23 2123)   iopamidol (ISOVUE-370) solution 90 mL (90 mLs Intravenous Given 2/21/23 2123)   HYDROmorphone (PF) (DILAUDID) injection 0.3 mg (0.3 mg Intravenous Given 2/21/23 2226)         Impression:    ICD-10-CM    1. Acute appendicitis, unspecified acute appendicitis type  K35.80 Case Request: APPENDECTOMY, LAPAROSCOPIC possible open     Case Request: APPENDECTOMY, LAPAROSCOPIC possible open          Plan:    Pending studies include none  zosyn given.  pending appe in ,      MD Francis Colin Scott J,  MD  02/21/23 3544

## 2023-02-22 NOTE — ED PROVIDER NOTES
History     Chief Complaint   Patient presents with     Abdominal Pain     Pain to [facility] that started yesterday. Denies fever/ chills. Intermittent nausea, no emesis. Denies bowel and bladder concerns.      HPI  Guadalupe Terry is a 42 year old female with a past medical history significant for depression emergency department complaining of right lower quadrant abdominal pain.  Patient states pain began yesterday as a vague upper abdominal pain she states pain worsened throughout the day today and is more in the right lower quadrant now has had some mild nausea has not vomited has decreased appetite has had normal bowel movements and no urinary symptoms.  She does not think she has had a fever denies chills.  She has not had a headache denies visual changes or neck pain has not had chest pain or shortness of breath.  She denies any bowel or bladder dysfunction.  She has not any focal numbness weakness in extremity denies leg swelling or calf pain.    Allergies:  Allergies   Allergen Reactions     Hydrocodone Itching     No Clinical Screening - See Comments Itching       Problem List:    There are no problems to display for this patient.       Past Medical History:    Past Medical History:   Diagnosis Date     Depressive disorder        Past Surgical History:    No past surgical history on file.    Family History:    Family History   Problem Relation Age of Onset     Depression Mother      Bipolar Disorder Mother      Depression Sister      Anxiety Disorder Daughter        Social History:  Marital Status:  Single [1]  Social History     Tobacco Use     Smoking status: Every Day     Types: Other     Smokeless tobacco: Never   Substance Use Topics     Alcohol use: Yes     Comment: daily was a bottle per day could go through, since recent detox 2 drinks at night     Drug use: No        Medications:    buPROPion (WELLBUTRIN XL) 150 MG 24 hr tablet  lamoTRIgine (LAMICTAL) 100 MG tablet  lisinopril-hydrochlorothiazide  "(ZESTORETIC) 20-25 MG tablet  Cholecalciferol (VITAMIN D3) 1000 UNITS CAPS  fish oil-omega-3 fatty acids 1000 MG capsule  fluticasone (FLONASE) 50 MCG/ACT spray  levothyroxine (SYNTHROID/LEVOTHROID) 25 MCG tablet  Multiple Vitamin (MULTI-VITAMINS) TABS  norgestim-eth estrad triphasic (ORTHO TRI-CYCLEN, TRI-SPRINTEC) 0.18/0.215/0.25 MG-35 MCG per tablet  venlafaxine (EFFEXOR-XR) 150 MG 24 hr capsule  venlafaxine (EFFEXOR-XR) 75 MG 24 hr capsule          Review of Systems  All systems reviewed and other than pertinent positives and negatives in HPI all other systems are negative.  Physical Exam   BP: (!) 145/88  Pulse: 91  Temp: 98.1  F (36.7  C)  Resp: 20  Height: 166.4 cm (5' 5.5\")  Weight: 86.2 kg (190 lb)  SpO2: 98 %      Physical Exam  Vitals and nursing note reviewed.   Constitutional:       Appearance: She is well-developed. She is not ill-appearing, toxic-appearing or diaphoretic.      Comments: Mild abdominal distress.   HENT:      Head: Normocephalic and atraumatic.      Nose: Nose normal.      Mouth/Throat:      Mouth: Mucous membranes are moist.      Pharynx: Oropharynx is clear.   Eyes:      Conjunctiva/sclera: Conjunctivae normal.   Cardiovascular:      Rate and Rhythm: Normal rate and regular rhythm.      Pulses: Normal pulses.      Heart sounds: Normal heart sounds. No murmur heard.  Pulmonary:      Effort: Pulmonary effort is normal.      Breath sounds: Normal breath sounds. No stridor. No wheezing or rhonchi.   Abdominal:      Comments: Soft, nondistended tender to palpation right lower quadrant mild guarding and rebound present bowel sounds are positive no masses or hernia present.  There is no flank pain present.   Musculoskeletal:         General: No swelling or tenderness. Normal range of motion.      Cervical back: Normal range of motion and neck supple.      Right lower leg: No edema.      Left lower leg: No edema.   Skin:     General: Skin is warm and dry.      Capillary Refill: Capillary " refill takes less than 2 seconds.   Neurological:      General: No focal deficit present.      Mental Status: She is alert and oriented to person, place, and time.      Sensory: No sensory deficit.      Motor: No weakness.      Coordination: Coordination normal.   Psychiatric:         Mood and Affect: Mood normal.         ED Course                 Procedures              Critical Care time:  none               Results for orders placed or performed during the hospital encounter of 02/21/23 (from the past 24 hour(s))   Comprehensive metabolic panel   Result Value Ref Range    Sodium 132 (L) 136 - 145 mmol/L    Potassium 3.6 3.4 - 5.3 mmol/L    Chloride 95 (L) 98 - 107 mmol/L    Carbon Dioxide (CO2) 25 22 - 29 mmol/L    Anion Gap 12 7 - 15 mmol/L    Urea Nitrogen 11.6 6.0 - 20.0 mg/dL    Creatinine 0.64 0.51 - 0.95 mg/dL    Calcium 9.1 8.6 - 10.0 mg/dL    Glucose 97 70 - 99 mg/dL    Alkaline Phosphatase 45 35 - 104 U/L    AST 21 10 - 35 U/L    ALT 18 10 - 35 U/L    Protein Total 7.6 6.4 - 8.3 g/dL    Albumin 4.2 3.5 - 5.2 g/dL    Bilirubin Total 0.9 <=1.2 mg/dL    GFR Estimate >90 >60 mL/min/1.73m2   CBC with platelets, differential    Narrative    The following orders were created for panel order CBC with platelets, differential.  Procedure                               Abnormality         Status                     ---------                               -----------         ------                     CBC with platelets and d...[[id number]]  Abnormal            Final result                 Please view results for these tests on the individual orders.   Lipase   Result Value Ref Range    Lipase 22 13 - 60 U/L   CBC with platelets and differential   Result Value Ref Range    WBC Count 9.8 4.0 - 11.0 10e3/uL    RBC Count 3.71 (L) 3.80 - 5.20 10e6/uL    Hemoglobin 12.0 11.7 - 15.7 g/dL    Hematocrit 35.9 35.0 - 47.0 %    MCV 97 78 - 100 fL    MCH 32.3 26.5 - 33.0 pg    MCHC 33.4 31.5 - 36.5 g/dL    RDW 11.9 10.0 - 15.0 %     Platelet Count 296 150 - 450 10e3/uL    % Neutrophils 63 %    % Lymphocytes 26 %    % Monocytes 8 %    % Eosinophils 2 %    % Basophils 1 %    % Immature Granulocytes 0 %    NRBCs per 100 WBC 0 <1 /100    Absolute Neutrophils 6.2 1.6 - 8.3 10e3/uL    Absolute Lymphocytes 2.6 0.8 - 5.3 10e3/uL    Absolute Monocytes 0.8 0.0 - 1.3 10e3/uL    Absolute Eosinophils 0.2 0.0 - 0.7 10e3/uL    Absolute Basophils 0.1 0.0 - 0.2 10e3/uL    Absolute Immature Granulocytes 0.0 <=0.4 10e3/uL    Absolute NRBCs 0.0 10e3/uL       Medications - No data to display  Results for orders placed or performed during the hospital encounter of 02/21/23   CT Abdomen Pelvis w Contrast    Narrative    EXAM: CT ABDOMEN PELVIS W CONTRAST  LOCATION: [facility]  DATE/TIME: 2/21/2023 9:33 PM    INDICATION: Right lower quadrant abdominal pain rule out appendicitis  COMPARISON: None.  TECHNIQUE: CT scan of the abdomen and pelvis was performed following injection of IV contrast. Multiplanar reformats were obtained. Dose reduction techniques were used.  CONTRAST: 63 mL Isovue 370    FINDINGS:   LOWER CHEST: Normal.    HEPATOBILIARY: Normal.    PANCREAS: Normal.    SPLEEN: Normal.    ADRENAL GLANDS: Normal.    KIDNEYS/BLADDER: Normal.    BOWEL: Dilated fluid-filled appendix measuring up to 10 mm with surrounding. Appendiceal inflammatory stranding, findings consistent with acute appendicitis. No periappendiceal abscess or free intraperitoneal gas. Small volume free fluid tracking into   the dependent pelvis. No bowel obstruction.    LYMPH NODES: Normal.    VASCULATURE: Unremarkable.    PELVIC ORGANS: Normal.    MUSCULOSKELETAL: Breast implants. Umbilical ornamentation. Tiny fat-containing umbilical hernia. Mild multilevel degenerative changes of the lumbar spine. No acute osseous abnormality.      Impression    IMPRESSION:   1.  Acute uncomplicated appendicitis. Small volume free fluid tracking into the dependent pelvis. No  periappendiceal abscess.       Assessments & Plan (with Medical Decision Making) past medical history was reviewed.  Labs were obtained patient was given a fluid bolus.  Patient was offered pain medication but did not want any.  Patient's white count was 9.8 hemoglobin 12.0 platelet count 296 there is no left shift.  Comprehensive metabolic panel significant for sodium 132 potassium 3.6 chloride 95.  Pregnancy test was negative.  CT scan of the abdomen pelvis was obtained.  Due to patient's presentation exam and history.  This revealed an acute uncomplicated appendicitis with small volume free fluid tracking into the dependent pelvis no evidence of abscess.  No other abnormality noted.  At this time I discussed case with Dr. Tran general surgeon covering [facility] at this time.  He wanted patient covered with Zosyn and he would take her to the operating room in the morning for laparoscopic appendicitis.  Antibiotics were given.  Orders will be written.  Patient will be kept as a border in the ED and will be going to the OR in the early morning per Dr. Tran.     I have reviewed the nursing notes.    I have reviewed the findings, diagnosis, plan and need for follow up with the patient.         New Prescriptions    No medications on file       Final diagnoses:   Acute appendicitis, unspecified acute appendicitis type       2/21/2023   [facility] EMERGENCY DEPT

## 2023-02-22 NOTE — ANESTHESIA PREPROCEDURE EVALUATION
Anesthesia Pre-Procedure Evaluation    Patient: Guadalupe Terry   MRN: [id number] : 1980        Procedure : Procedure(s):  APPENDECTOMY, LAPAROSCOPIC possible open          Past Medical History:   Diagnosis Date     Depressive disorder       No past surgical history on file.   Allergies   Allergen Reactions     Hydrocodone Itching     No Clinical Screening - See Comments Itching      Social History     Tobacco Use     Smoking status: Every Day     Types: Other     Smokeless tobacco: Never   Substance Use Topics     Alcohol use: Yes     Comment: daily was a bottle per day could go through, since recent detox 2 drinks at night      Wt Readings from Last 1 Encounters:   23 86.2 kg (190 lb)        Anesthesia Evaluation            ROS/MED HX  ENT/Pulmonary:     (+) tobacco use, Current use,     Neurologic:  - neg neurologic ROS     Cardiovascular:  - neg cardiovascular ROS     METS/Exercise Tolerance:     Hematologic:  - neg hematologic  ROS     Musculoskeletal:  - neg musculoskeletal ROS     GI/Hepatic:     (+) appendicitis,     Renal/Genitourinary:  - neg Renal ROS     Endo:     (+) Obesity,     Psychiatric/Substance Use:     (+) psychiatric history depression     Infectious Disease:  - neg infectious disease ROS     Malignancy:  - neg malignancy ROS     Other:  - neg other ROS          Physical Exam    Airway        Mallampati: II   TM distance: > 3 FB   Neck ROM: full   Mouth opening: > 3 cm    Respiratory Devices and Support         Dental       (+) Completely normal teeth      Cardiovascular   cardiovascular exam normal          Pulmonary   pulmonary exam normal                OUTSIDE LABS:  CBC:   Lab Results   Component Value Date    WBC 9.8 2023    WBC 6.3 2018    HGB 12.0 2023    HGB 13.5 2018    HCT 35.9 2023    HCT 39.3 2018     2023     2018     BMP:   Lab Results   Component Value Date     (L) 2023     2018     POTASSIUM 3.6 02/21/2023    POTASSIUM 3.7 05/28/2018    CHLORIDE 95 (L) 02/21/2023    CHLORIDE 99 05/28/2018    CO2 25 02/21/2023    CO2 25 05/28/2018    BUN 11.6 02/21/2023    BUN 7 05/28/2018    CR 0.64 02/21/2023    CR 0.52 05/28/2018    GLC 97 02/21/2023     (H) 05/28/2018     COAGS: No results found for: PTT, INR, FIBR  POC:   Lab Results   Component Value Date    HCG Negative 05/29/2018    HCGS Negative 02/21/2023     HEPATIC:   Lab Results   Component Value Date    ALBUMIN 4.2 02/21/2023    PROTTOTAL 7.6 02/21/2023    ALT 18 02/21/2023    AST 21 02/21/2023    ALKPHOS 45 02/21/2023    BILITOTAL 0.9 02/21/2023     OTHER:   Lab Results   Component Value Date    DI 9.1 02/21/2023    MAG 1.7 05/28/2018    LIPASE 22 02/21/2023    TSH 1.50 08/17/2005    T4 0.94 08/17/2005    T3 165 08/17/2005       Anesthesia Plan    ASA Status:  2      Anesthesia Type: General.     - Airway: ETT   Induction: Propofol.   Maintenance: Balanced.        Consents    Anesthesia Plan(s) and associated risks, benefits, and realistic alternatives discussed. Questions answered and patient/representative(s) expressed understanding.     - Discussed: Risks, Benefits and Alternatives for BOTH SEDATION and the PROCEDURE were discussed     - Discussed with:  Patient         Postoperative Care    Pain management: IV analgesics, Oral pain medications, Multi-modal analgesia.   PONV prophylaxis: Ondansetron (or other 5HT-3), Dexamethasone or Solumedrol     Comments:                SATISH Dumont CRNA

## 2023-02-22 NOTE — ANESTHESIA CARE TRANSFER NOTE
Patient: Guadalupe Terry    Procedure: Procedure(s):  APPENDECTOMY, LAPAROSCOPIC       Diagnosis: Acute appendicitis, unspecified acute appendicitis type [K35.80]  Diagnosis Additional Information: No value filed.    Anesthesia Type:   General     Note:    Oropharynx: oropharynx clear of all foreign objects  Level of Consciousness: awake  Oxygen Supplementation: face mask    Independent Airway: airway patency satisfactory and stable    Vital Signs Stable: post-procedure vital signs reviewed and stable  Report to RN Given: handoff report given  Patient transferred to: PACU    Handoff Report: Identifed the Patient, Identified the Reponsible Provider, Reviewed the pertinent medical history, Discussed the surgical course, Reviewed Intra-OP anesthesia mangement and issues during anesthesia, Set expectations for post-procedure period and Allowed opportunity for questions and acknowledgement of understanding      Vitals:  Vitals Value Taken Time   /93 02/22/23 0730   Temp     Pulse 112 02/22/23 0733   Resp 17 02/22/23 0733   SpO2 98 % 02/22/23 0733   Vitals shown include unvalidated device data.    Electronically Signed By: SATISH Das CRNA  February 22, 2023  7:34 AM

## 2023-02-22 NOTE — ED NOTES
"[facility]   Admission Handoff    The patient is Guadalupe Terry, 42 year old who arrived in the ED by CAR from home with a complaint of Abdominal Pain (Pain to RLQ that started yesterday. Denies fever/ chills. Intermittent nausea, no emesis. Denies bowel and bladder concerns. )  . The patient's current symptoms are new and during this time the symptoms have remained the same. In the ED, patient was diagnosed with   Final diagnoses:   Acute appendicitis, unspecified acute appendicitis type         Needed?: No    Allergies:    Allergies   Allergen Reactions     Hydrocodone Itching     No Clinical Screening - See Comments Itching       Past Medical Hx:   Past Medical History:   Diagnosis Date     Depressive disorder        Initial vitals were: BP: (!) 145/88  Pulse: 91  Temp: 98.1  F (36.7  C)  Resp: 20  Height: 166.4 cm (5' 5.5\")  Weight: 86.2 kg (190 lb)  SpO2: 98 %   Recent vital Signs: /69   Pulse 97   Temp 97.9  F (36.6  C) (Oral)   Resp 20   Ht 1.664 m (5' 5.5\")   Wt 86.2 kg (190 lb)   SpO2 98%   BMI 31.14 kg/m      Elimination Status: Continent: Yes     Activity Level: Independent    Fall Status:       Baseline Mental status: WDL  Current Mental Status changes: at basesline    Infection present or suspected this encounter: no  Sepsis suspected: No    Isolation type: NA    Bariatric equipment needed?: No    In the ED these meds were given:   Medications   piperacillin-tazobactam (ZOSYN) infusion 3.375 g (3.375 g Intravenous New Bag 2/21/23 2228)   sodium chloride 0.9% infusion ( Intravenous New Bag 2/21/23 0363)   naloxone (NARCAN) injection 0.2 mg (has no administration in time range)     Or   naloxone (NARCAN) injection 0.4 mg (has no administration in time range)     Or   naloxone (NARCAN) injection 0.2 mg (has no administration in time range)     Or   naloxone (NARCAN) injection 0.4 mg (has no administration in time range)   HYDROmorphone (PF) (DILAUDID) " injection 0.3-0.5 mg (0.5 mg Intravenous Given 2/21/23 2332)   ondansetron (ZOFRAN ODT) ODT tab 4 mg (has no administration in time range)     Or   ondansetron (ZOFRAN) injection 4 mg (has no administration in time range)   sodium chloride 0.9 % bag 500mL for CT scan flush use (63 mLs Intravenous Given 2/21/23 2123)   iopamidol (ISOVUE-370) solution 90 mL (90 mLs Intravenous Given 2/21/23 2123)   HYDROmorphone (PF) (DILAUDID) injection 0.3 mg (0.3 mg Intravenous Given 2/21/23 2226)       Drips running?  No    Home pump  No    Current LDAs: Peripheral IV: Site LFA; Gauge 20  lactated Ringer's     Results:   Labs/Imaging  Ordered and Resulted from Time of ED Arrival Up to the Time of Departure from the ED  Results for orders placed or performed during the hospital encounter of 02/21/23 (from the past 24 hour(s))   Comprehensive metabolic panel   Result Value Ref Range    Sodium 132 (L) 136 - 145 mmol/L    Potassium 3.6 3.4 - 5.3 mmol/L    Chloride 95 (L) 98 - 107 mmol/L    Carbon Dioxide (CO2) 25 22 - 29 mmol/L    Anion Gap 12 7 - 15 mmol/L    Urea Nitrogen 11.6 6.0 - 20.0 mg/dL    Creatinine 0.64 0.51 - 0.95 mg/dL    Calcium 9.1 8.6 - 10.0 mg/dL    Glucose 97 70 - 99 mg/dL    Alkaline Phosphatase 45 35 - 104 U/L    AST 21 10 - 35 U/L    ALT 18 10 - 35 U/L    Protein Total 7.6 6.4 - 8.3 g/dL    Albumin 4.2 3.5 - 5.2 g/dL    Bilirubin Total 0.9 <=1.2 mg/dL    GFR Estimate >90 >60 mL/min/1.73m2   CBC with platelets, differential    Narrative    The following orders were created for panel order CBC with platelets, differential.  Procedure                               Abnormality         Status                     ---------                               -----------         ------                     CBC with platelets and d...[[id number]]  Abnormal            Final result                 Please view results for these tests on the individual orders.   Lipase   Result Value Ref Range    Lipase 22 13 - 60 U/L   CBC with  platelets and differential   Result Value Ref Range    WBC Count 9.8 4.0 - 11.0 10e3/uL    RBC Count 3.71 (L) 3.80 - 5.20 10e6/uL    Hemoglobin 12.0 11.7 - 15.7 g/dL    Hematocrit 35.9 35.0 - 47.0 %    MCV 97 78 - 100 fL    MCH 32.3 26.5 - 33.0 pg    MCHC 33.4 31.5 - 36.5 g/dL    RDW 11.9 10.0 - 15.0 %    Platelet Count 296 150 - 450 10e3/uL    % Neutrophils 63 %    % Lymphocytes 26 %    % Monocytes 8 %    % Eosinophils 2 %    % Basophils 1 %    % Immature Granulocytes 0 %    NRBCs per 100 WBC 0 <1 /100    Absolute Neutrophils 6.2 1.6 - 8.3 10e3/uL    Absolute Lymphocytes 2.6 0.8 - 5.3 10e3/uL    Absolute Monocytes 0.8 0.0 - 1.3 10e3/uL    Absolute Eosinophils 0.2 0.0 - 0.7 10e3/uL    Absolute Basophils 0.1 0.0 - 0.2 10e3/uL    Absolute Immature Granulocytes 0.0 <=0.4 10e3/uL    Absolute NRBCs 0.0 10e3/uL   HCG qualitative pregnancy (blood)   Result Value Ref Range    hCG Serum Qualitative Negative Negative   CT Abdomen Pelvis w Contrast    Narrative    EXAM: CT ABDOMEN PELVIS W CONTRAST  LOCATION: [facility]  DATE/TIME: 2/21/2023 9:33 PM    INDICATION: Right lower quadrant abdominal pain rule out appendicitis  COMPARISON: None.  TECHNIQUE: CT scan of the abdomen and pelvis was performed following injection of IV contrast. Multiplanar reformats were obtained. Dose reduction techniques were used.  CONTRAST: 63 mL Isovue 370    FINDINGS:   LOWER CHEST: Normal.    HEPATOBILIARY: Normal.    PANCREAS: Normal.    SPLEEN: Normal.    ADRENAL GLANDS: Normal.    KIDNEYS/BLADDER: Normal.    BOWEL: Dilated fluid-filled appendix measuring up to 10 mm with surrounding. Appendiceal inflammatory stranding, findings consistent with acute appendicitis. No periappendiceal abscess or free intraperitoneal gas. Small volume free fluid tracking into   the dependent pelvis. No bowel obstruction.    LYMPH NODES: Normal.    VASCULATURE: Unremarkable.    PELVIC ORGANS: Normal.    MUSCULOSKELETAL: Breast implants.  Umbilical ornamentation. Tiny fat-containing umbilical hernia. Mild multilevel degenerative changes of the lumbar spine. No acute osseous abnormality.      Impression    IMPRESSION:   1.  Acute uncomplicated appendicitis. Small volume free fluid tracking into the dependent pelvis. No periappendiceal abscess.       For the majority of the shift this patient's behavior was Green     Cardiac Rhythm: N/A  Pt needs tele? No  Skin/wound Issues: None    Code Status: Full Code    Pain control: good    Nausea control: good    Abnormal labs/tests/findings requiring intervention: CT appendicitis     Patient tested for COVID 19 prior to admission: NO     OBS brochure/video discussed/provided to patient/family: N/A     Family present during ED course? No     Family Comments/Social Situation comments: NA    Tasks needing completion: None    Trevor Burleson RN

## 2023-02-22 NOTE — ED TRIAGE NOTES
PT reports right sided abdominal pain , pain comes in waves, loss of appetite x1 day.      Writer and provider spoke with pt about treatment and diagnostic options in ED vs. UC. Pt agreed to be evaluated in the ED.

## 2023-02-23 LAB
PATH REPORT.COMMENTS IMP SPEC: NORMAL
PATH REPORT.COMMENTS IMP SPEC: NORMAL
PATH REPORT.FINAL DX SPEC: NORMAL
PATH REPORT.GROSS SPEC: NORMAL
PATH REPORT.MICROSCOPIC SPEC OTHER STN: NORMAL
PATH REPORT.RELEVANT HX SPEC: NORMAL
PHOTO IMAGE: NORMAL

## 2024-01-21 ENCOUNTER — HEALTH MAINTENANCE LETTER (OUTPATIENT)
Age: 44
End: 2024-01-21

## 2024-03-31 ENCOUNTER — HEALTH MAINTENANCE LETTER (OUTPATIENT)
Age: 44
End: 2024-03-31

## 2024-05-25 ENCOUNTER — APPOINTMENT (OUTPATIENT)
Dept: MRI IMAGING | Facility: CLINIC | Age: 44
End: 2024-05-25
Attending: EMERGENCY MEDICINE
Payer: OTHER MISCELLANEOUS

## 2024-05-25 ENCOUNTER — HOSPITAL ENCOUNTER (EMERGENCY)
Facility: CLINIC | Age: 44
Discharge: HOME | End: 2024-05-25
Attending: EMERGENCY MEDICINE | Admitting: EMERGENCY MEDICINE
Payer: OTHER MISCELLANEOUS

## 2024-05-25 VITALS
HEART RATE: 104 BPM | BODY MASS INDEX: 28.93 KG/M2 | TEMPERATURE: 99.2 F | SYSTOLIC BLOOD PRESSURE: 115 MMHG | OXYGEN SATURATION: 97 % | DIASTOLIC BLOOD PRESSURE: 77 MMHG | HEIGHT: 66 IN | WEIGHT: 180 LBS | RESPIRATION RATE: 24 BRPM

## 2024-05-25 DIAGNOSIS — M79.671 RIGHT FOOT PAIN: ICD-10-CM

## 2024-05-25 DIAGNOSIS — G89.18 POST-OP PAIN: ICD-10-CM

## 2024-05-25 LAB
ANION GAP SERPL CALCULATED.3IONS-SCNC: 17 MMOL/L (ref 7–15)
BASOPHILS # BLD AUTO: 0.1 10E3/UL (ref 0–0.2)
BASOPHILS NFR BLD AUTO: 1 %
BUN SERPL-MCNC: 10 MG/DL (ref 6–20)
CALCIUM SERPL-MCNC: 8.9 MG/DL (ref 8.6–10)
CHLORIDE SERPL-SCNC: 100 MMOL/L (ref 98–107)
CREAT SERPL-MCNC: 0.7 MG/DL (ref 0.51–0.95)
CRP SERPL-MCNC: 4.88 MG/L
DEPRECATED HCO3 PLAS-SCNC: 22 MMOL/L (ref 22–29)
EGFRCR SERPLBLD CKD-EPI 2021: >90 ML/MIN/1.73M2
EOSINOPHIL # BLD AUTO: 0.5 10E3/UL (ref 0–0.7)
EOSINOPHIL NFR BLD AUTO: 6 %
ERYTHROCYTE [DISTWIDTH] IN BLOOD BY AUTOMATED COUNT: 12.8 % (ref 10–15)
GLUCOSE SERPL-MCNC: 87 MG/DL (ref 70–99)
HCT VFR BLD AUTO: 38.3 % (ref 35–47)
HGB BLD-MCNC: 12.6 G/DL (ref 11.7–15.7)
IMM GRANULOCYTES # BLD: 0 10E3/UL
IMM GRANULOCYTES NFR BLD: 0 %
LYMPHOCYTES # BLD AUTO: 3.1 10E3/UL (ref 0.8–5.3)
LYMPHOCYTES NFR BLD AUTO: 40 %
MCH RBC QN AUTO: 32.1 PG (ref 26.5–33)
MCHC RBC AUTO-ENTMCNC: 32.9 G/DL (ref 31.5–36.5)
MCV RBC AUTO: 98 FL (ref 78–100)
MONOCYTES # BLD AUTO: 0.8 10E3/UL (ref 0–1.3)
MONOCYTES NFR BLD AUTO: 10 %
NEUTROPHILS # BLD AUTO: 3.3 10E3/UL (ref 1.6–8.3)
NEUTROPHILS NFR BLD AUTO: 42 %
NRBC # BLD AUTO: 0 10E3/UL
NRBC BLD AUTO-RTO: 0 /100
PLATELET # BLD AUTO: 336 10E3/UL (ref 150–450)
POTASSIUM SERPL-SCNC: 3.8 MMOL/L (ref 3.4–5.3)
RBC # BLD AUTO: 3.92 10E6/UL (ref 3.8–5.2)
SODIUM SERPL-SCNC: 139 MMOL/L (ref 135–145)
WBC # BLD AUTO: 7.7 10E3/UL (ref 4–11)

## 2024-05-25 PROCEDURE — 80048 BASIC METABOLIC PNL TOTAL CA: CPT | Performed by: EMERGENCY MEDICINE

## 2024-05-25 PROCEDURE — 86140 C-REACTIVE PROTEIN: CPT | Performed by: EMERGENCY MEDICINE

## 2024-05-25 PROCEDURE — A9585 GADOBUTROL INJECTION: HCPCS | Performed by: EMERGENCY MEDICINE

## 2024-05-25 PROCEDURE — 250N000011 HC RX IP 250 OP 636: Performed by: EMERGENCY MEDICINE

## 2024-05-25 PROCEDURE — 73723 MRI JOINT LWR EXTR W/O&W/DYE: CPT | Mod: RT

## 2024-05-25 PROCEDURE — 99284 EMERGENCY DEPT VISIT MOD MDM: CPT | Mod: 25

## 2024-05-25 PROCEDURE — 99284 EMERGENCY DEPT VISIT MOD MDM: CPT | Performed by: EMERGENCY MEDICINE

## 2024-05-25 PROCEDURE — 96372 THER/PROPH/DIAG INJ SC/IM: CPT | Performed by: EMERGENCY MEDICINE

## 2024-05-25 PROCEDURE — 85025 COMPLETE CBC W/AUTO DIFF WBC: CPT | Performed by: EMERGENCY MEDICINE

## 2024-05-25 PROCEDURE — 96374 THER/PROPH/DIAG INJ IV PUSH: CPT | Mod: 59

## 2024-05-25 PROCEDURE — 36415 COLL VENOUS BLD VENIPUNCTURE: CPT | Performed by: EMERGENCY MEDICINE

## 2024-05-25 PROCEDURE — 255N000002 HC RX 255 OP 636: Performed by: EMERGENCY MEDICINE

## 2024-05-25 RX ORDER — DIPHENHYDRAMINE HYDROCHLORIDE 50 MG/ML
50 INJECTION INTRAMUSCULAR; INTRAVENOUS ONCE
Status: COMPLETED | OUTPATIENT
Start: 2024-05-25 | End: 2024-05-25

## 2024-05-25 RX ORDER — GADOBUTROL 604.72 MG/ML
8 INJECTION INTRAVENOUS ONCE
Status: COMPLETED | OUTPATIENT
Start: 2024-05-25 | End: 2024-05-25

## 2024-05-25 RX ORDER — OXYCODONE HYDROCHLORIDE 5 MG/1
5 TABLET ORAL EVERY 6 HOURS PRN
Qty: 10 TABLET | Refills: 0 | Status: SHIPPED | OUTPATIENT
Start: 2024-05-25

## 2024-05-25 RX ADMIN — GADOBUTROL 8 ML: 604.72 INJECTION INTRAVENOUS at 12:32

## 2024-05-25 RX ADMIN — HYDROMORPHONE HYDROCHLORIDE 2 MG: 1 INJECTION, SOLUTION INTRAMUSCULAR; INTRAVENOUS; SUBCUTANEOUS at 10:25

## 2024-05-25 RX ADMIN — DIPHENHYDRAMINE HYDROCHLORIDE 50 MG: 50 INJECTION, SOLUTION INTRAMUSCULAR; INTRAVENOUS at 11:05

## 2024-05-25 ASSESSMENT — ACTIVITIES OF DAILY LIVING (ADL)
ADLS_ACUITY_SCORE: 35

## 2024-05-25 ASSESSMENT — COLUMBIA-SUICIDE SEVERITY RATING SCALE - C-SSRS
6. HAVE YOU EVER DONE ANYTHING, STARTED TO DO ANYTHING, OR PREPARED TO DO ANYTHING TO END YOUR LIFE?: NO
1. IN THE PAST MONTH, HAVE YOU WISHED YOU WERE DEAD OR WISHED YOU COULD GO TO SLEEP AND NOT WAKE UP?: NO
2. HAVE YOU ACTUALLY HAD ANY THOUGHTS OF KILLING YOURSELF IN THE PAST MONTH?: NO

## 2024-05-25 NOTE — DISCHARGE INSTRUCTIONS
Follow-up with your podiatrist early next week.    Return to be seen if new or worsening symptoms develop.    Return if you develop fever, pus, spreading redness, or other infectious concerns.    Tylenol, 1000 mg every 6 hours.    You can also take ibuprofen, 600 mg every 6 hours.    Oxycodone for severe pains.

## 2024-05-25 NOTE — ED TRIAGE NOTES
Pt presents to ED via EMS with c/o right heal pain. Pt's right foot was ran over by scooter at work-works at [employer]. Had surgery 10 days ago. Was seen for follow up yesterday and states surgeon was happy with healing process. Pt took percocet x 2 about 1 hour PTA to ED.     Pt has soft splint/wrap in wrap-removed by RN. Sutures intact and dry. Redness noted to right heal-non blanchable.      Triage Assessment (Adult)       Row Name 05/25/24 0953          Triage Assessment    Airway WDL WDL        Respiratory WDL    Respiratory WDL WDL        Skin Circulation/Temperature WDL    Skin Circulation/Temperature WDL WDL        Cardiac WDL    Cardiac WDL X;rhythm     Pulse Rate & Regularity tachycardic        Peripheral/Neurovascular WDL    Peripheral Neurovascular WDL WDL        Cognitive/Neuro/Behavioral WDL    Cognitive/Neuro/Behavioral WDL WDL

## 2024-05-25 NOTE — ED PROVIDER NOTES
ED Provider Note  [facility]      History     Chief Complaint   Patient presents with    Post-op Problem     Right foot surgery ~ 10 days ago. Woke up this AM with severe right heal pain.      HPI  Guadalupe Terry is a 43 year old female who presents with severe right foot, and right heel pain.  Patient awoke with severe pain this morning.  Has had ongoing pain since the procedure 10 days ago.  Took 2 tablets oxycodone prior to ED arrival.  Unrelenting pain.  No new fever.  No drainage from the wounds.  Saw foot provider/surgeon yesterday, and was healing well.        I reviewed surgery procedure note:       1. Hardware removal (deep), right foot.  2. Revision first tarsometatarsal joint arthrodesis, right foot.  3. Second tarsometatarsal joint arthrodesis, right foot.  4. Debridement and repair of longitudinal tear of peroneus brevis tendon with tenodesis of peroneus brevis to peroneus longus, right ankle.       Independent Historian:     states patient not experiencing severe pain until today.    Review of External Notes:  I reviewed May 15 surgery note:    Nursing Notes:   Sarah Gaming 5/2/2024 6:57 AM Signed  Guadalupe Terry is a 43 y.o. female (1980) who presents for preop evaluation undergoing Hardware removal right foot; 1st and 2nd tarsometatarsal joint arthrodesis right foot; Peroneal tendon repair right ankle; Possible ankle ligament repair right ankle     Date of Surgery: 5/15/2024  Surgical Specialty: Podiatry  Sky Laureano DPM - [facility]  Hospital/Surgical Facility: [facility]   Fax number:   Surgery type: outpatient  Primary Physician: Carmel Armijo      History of Present Illness   Work related foot injury .  Surgery planned as above .   She has past medical history of hypothyroidism and hypertension that can impact surgical outcome   Recommend labs today to ensure stability and optimal  medical management before surgery .  She has no new concerns       Allergies:  Allergies   Allergen Reactions    Hydrocodone Itching    No Clinical Screening - See Comments Itching       Problem List:    Patient Active Problem List    Diagnosis Date Noted    Depressive disorder 02/22/2023     Priority: Medium    Hypothyroidism 02/22/2023     Priority: Medium    Lisfranc dislocation 02/22/2023     Priority: Medium    HTN (hypertension) 07/06/2017     Priority: Medium    Alcohol dependence with physiological dependence (H) 10/14/2008     Priority: Medium    Other chronic sinusitis 05/01/2008     Priority: Medium    Other acne 02/04/2007     Priority: Medium        Past Medical History:    Past Medical History:   Diagnosis Date    Depressive disorder        Past Surgical History:    Past Surgical History:   Procedure Laterality Date    LAPAROSCOPIC APPENDECTOMY N/A 2/22/2023    Procedure: APPENDECTOMY, LAPAROSCOPIC;  Surgeon: Amish Croft MD;  Location: [facility] OR       Family History:    Family History   Problem Relation Age of Onset    Depression Mother     Bipolar Disorder Mother     Depression Sister     Anxiety Disorder Daughter        Social History:  Marital Status:  Single [1]  Social History     Tobacco Use    Smoking status: Every Day     Types: Other    Smokeless tobacco: Never   Substance Use Topics    Alcohol use: Yes     Comment: daily was a bottle per day could go through, since recent detox 2 drinks at night    Drug use: No        Medications:    oxyCODONE (ROXICODONE) 5 MG tablet  buPROPion (WELLBUTRIN XL) 150 MG 24 hr tablet  Cholecalciferol (VITAMIN D3) 1000 UNITS CAPS  fish oil-omega-3 fatty acids 1000 MG capsule  fluticasone (FLONASE) 50 MCG/ACT spray  lamoTRIgine (LAMICTAL) 100 MG tablet  levothyroxine (SYNTHROID/LEVOTHROID) 25 MCG tablet  lisinopril-hydrochlorothiazide (ZESTORETIC) 20-25 MG tablet  Multiple Vitamin (MULTI-VITAMINS) TABS  norgestim-eth estrad triphasic (ORTHO TRI-CYCLEN,  "TRI-SPRINTEC) 0.18/0.215/0.25 MG-35 MCG per tablet  venlafaxine (EFFEXOR-XR) 150 MG 24 hr capsule  venlafaxine (EFFEXOR-XR) 75 MG 24 hr capsule          Review of Systems  A medically appropriate review of systems was performed with pertinent positives and negatives noted in the HPI, and all other systems negative.    Physical Exam   Patient Vitals for the past 24 hrs:   BP Temp Temp src Pulse Resp SpO2 Height Weight   05/25/24 1200 115/77 -- -- 104 -- 97 % -- --   05/25/24 1145 120/80 -- -- 96 -- 94 % -- --   05/25/24 1130 117/78 -- -- 91 -- 92 % -- --   05/25/24 1115 121/85 -- -- 89 -- 95 % -- --   05/25/24 1100 -- -- -- -- -- 97 % -- --   05/25/24 1036 -- -- -- -- -- 99 % -- --   05/25/24 1030 -- -- -- -- -- 99 % -- --   05/25/24 1025 (!) 131/95 -- -- 109 -- -- -- --   05/25/24 1000 (!) 136/101 -- -- 110 -- 97 % -- --   05/25/24 0958 (!) 136/101 -- -- -- -- -- -- --   05/25/24 0952 -- 99.2  F (37.3  C) Oral 108 24 95 % 1.676 m (5' 6\") 81.6 kg (180 lb)          Physical Exam  General: alert and in acute distress on arrival  Head: atraumatic, normocephalic  Lungs:  nonlabored  CV:  extremities warm and perfused  Abd: nondistended  Skin: Right foot with lacerations/surgical incisions which appear well-healing, clean, dry, and intact with sutures in place over the lateral malleoli are area, in addition to the dorsum of the right foot over the second and fourth metatarsal area.  No surrounding redness.  No drainage noted from any of the incision sites.  There is slight bruising in dependent areas of the right foot  Neuro: Patient awake, alert, speech is fluent,   Psychiatric: affect/mood normal,        ED Course                 Procedures                           Results for orders placed or performed during the hospital encounter of 05/25/24 (from the past 24 hour(s))   MR Ankle Right w/o & w Contrast    Narrative    EXAM: MR ANKLE RIGHT WITHOUT AND WITH CONTRAST  LOCATION: [facility]  DATE: 05/25/2024    INDICATION: Recent surgery 5/15. Severe pain in heel region. Hardware removal, Revision 1st tarsometatarsal joint arthrodesis, 2nd tarsometatarsal joint arthrodesis, debridement repair of peroneus brevis tendon with tenodesis of peroneus brevis to   peroneus longus.  COMPARISON: 05/15/2024.  TECHNIQUE: Routine. Additional postgadolinium T1 sequences were obtained.  IV CONTRAST: 8 mL Gadavist.    FINDINGS:     TENDONS:   -Peroneal: Peroneus longus and brevis tendinopathy with operative change compatible with tenodesis of peroneus brevis to peroneus longus. No high-grade peroneal tendon tear. Mild to moderate lateral tenosynovitis. Difficult to exclude infectious   tenosynovitis. No lateral tendon displacement.  -Medial: Posterior tibialis tendon is intact. Mild posterior tibialis tendinopathy. No posterior tibialis tenosynovitis. Flexor digitorum longus and flexor hallucis longus tendons are normal. No tenosynovitis.  -Anterior: Anterior tibialis, extensor hallucis longus, and extensor digitorum longus tendons are normal. No tenosynovitis.  -Achilles: No tendinopathy or paratenonitis.    LIGAMENTS:   -Anterior talofibular ligament: Intact.   -Calcaneofibular ligament: Intact.   -Posterior talofibular ligament: Intact.  -Syndesmotic inferior tibiofibular ligaments: Intact.  -Deltoid ligament complex: Intact.  -Spring ligament complex: Intact.    JOINTS AND BONES:   -Operative changes related to instrumented first and second TMT joint arthrodeses with hardware artifact and signal void. Marrow edema and enhancement within the medial and intermediate cuneiforms and corresponding first and second metatarsal bases are   presumably reactive related to recent operation. No acute ankle or hindfoot fracture. No calcaneus stress fracture. Navicular and cuboid intact. Remaining metatarsal bases intact.  -No defined talar dome osteochondral lesion. No significant hindfoot chondromalacia.  -Ankle and  posterior subtalar joint effusions without appreciable ankle or subtalar joint synovitis.    SOFT TISSUES:  -Plantar fascia: Intact. No acute fasciitis or tear.  -Sinus tarsi and tarsal tunnel: Preserved sinus Tarsi fat signal. No space-occupying mass is seen along the course of the tarsal tunnel.  -Muscles: Intrinsic muscle bulk is remarkable for mild muscle edema about the intrinsic deep plantar musculature at the arthrodeses site. No high-grade muscle strain.    Along the lateral malleolus, there is soft tissue swelling and cellulitis with deep subcutaneous fluid collection which abuts the distal fibula and measures approximately 2.5 cm anteroposterior by 0.4 cm transverse and extends craniocaudal approximately   2.8 cm, concerning for small abscess, likely deep to previous surgical incision. Given the close proximity of this collection with surrounding cellulitis and the lateral/peroneal tendon sheath, infectious lateral tenosynovitis is possible.    Additional ankle and hindfoot soft tissue swelling is moderate. No additional drainable fluid collection.       Impression    IMPRESSION:  1.  Focal lateral malleolus left ankle cellulitis with deep rim-enhancing fluid collection measuring 2.5 x 0.4 x 2.8 cm abutting the distal fibula near the common peroneal tendon sheath, concerning for an abscess. Note, adjacent mild to moderate peroneal   tenosynovitis is possibly infectious. Correlate clinically.  2.  Nothing definite for adjacent fibula acute osteomyelitis.  3.  Postoperative change peroneal tendons. No high-grade or full-thickness proximally retracted rupture.  4.  Postop instrumented first and second TMT joint arthrodeses.  5.  Moderate ankle and proximal leg soft tissue swelling. Soft tissue edema in Kager's fat pad.  6.  Ankle and subtalar joint effusions without significant synovitis.    NOTE: ABNORMAL REPORT    THE DICTATION ABOVE DESCRIBES AN ABNORMALITY FOR WHICH FOLLOW-UP IS NEEDED.      CBC with  platelets differential    Narrative    The following orders were created for panel order CBC with platelets differential.  Procedure                               Abnormality         Status                     ---------                               -----------         ------                     CBC with platelets and d...[[id number]]                      Final result                 Please view results for these tests on the individual orders.   Basic metabolic panel   Result Value Ref Range    Sodium 139 135 - 145 mmol/L    Potassium 3.8 3.4 - 5.3 mmol/L    Chloride 100 98 - 107 mmol/L    Carbon Dioxide (CO2) 22 22 - 29 mmol/L    Anion Gap 17 (H) 7 - 15 mmol/L    Urea Nitrogen 10.0 6.0 - 20.0 mg/dL    Creatinine 0.70 0.51 - 0.95 mg/dL    GFR Estimate >90 >60 mL/min/1.73m2    Calcium 8.9 8.6 - 10.0 mg/dL    Glucose 87 70 - 99 mg/dL   CRP Inflammation   Result Value Ref Range    CRP Inflammation 4.88 <5.00 mg/L   CBC with platelets and differential   Result Value Ref Range    WBC Count 7.7 4.0 - 11.0 10e3/uL    RBC Count 3.92 3.80 - 5.20 10e6/uL    Hemoglobin 12.6 11.7 - 15.7 g/dL    Hematocrit 38.3 35.0 - 47.0 %    MCV 98 78 - 100 fL    MCH 32.1 26.5 - 33.0 pg    MCHC 32.9 31.5 - 36.5 g/dL    RDW 12.8 10.0 - 15.0 %    Platelet Count 336 150 - 450 10e3/uL    % Neutrophils 42 %    % Lymphocytes 40 %    % Monocytes 10 %    % Eosinophils 6 %    % Basophils 1 %    % Immature Granulocytes 0 %    NRBCs per 100 WBC 0 <1 /100    Absolute Neutrophils 3.3 1.6 - 8.3 10e3/uL    Absolute Lymphocytes 3.1 0.8 - 5.3 10e3/uL    Absolute Monocytes 0.8 0.0 - 1.3 10e3/uL    Absolute Eosinophils 0.5 0.0 - 0.7 10e3/uL    Absolute Basophils 0.1 0.0 - 0.2 10e3/uL    Absolute Immature Granulocytes 0.0 <=0.4 10e3/uL    Absolute NRBCs 0.0 10e3/uL       MEDICATIONS GIVEN IN THE EMERGENCY DEPARTMENT:  Medications   HYDROmorphone (DILAUDID) injection 2 mg (2 mg Intramuscular $Given 5/25/24 9521)   diphenhydrAMINE (BENADRYL) injection 50 mg (50 mg  Intravenous $Given 5/25/24 1105)   gadobutrol (GADAVIST) injection 8 mL (8 mLs Intravenous $Given 5/25/24 1232)           Independent Interpretation (X-rays, CTs, rhythm strip):  None    Consultations/Discussion of Management or Tests:  Podiatry as below       Social Determinants of Health affecting care:  None       Assessments & Plan (with Medical Decision Making)  43 year old female who presents to the Emergency Department for evaluation of severe right foot pain.  She primarily is concerned with regards to pain near the right heel.  Patient had podiatry follow-up yesterday.  States that the podiatrist was pleased with the wound healing, and otherwise healing process which was occurring up until yesterday.  Patient has taken occasional oxycodone for pains.  Typically is taking approximately 1 tablet every 6 hours.  However, over the past couple of days has awoken with more severe pain.  This occurred this morning, with unrelenting, unbearable pain of the right foot.  There has been no drainage from the incision sites.  There is no surrounding redness that has been noted.  There is no fevers.  Patient required multiple doses of IV Dilaudid to help with pain control.  Ultimately MRI was performed as shown above, and patient with slight rim-enhancing lesion which is noted.    I discussed with podiatrist on-call, Dr. Baron.  She also did discuss that sometimes MRI over calls these types of findings, and may represent postoperative hematoma.  Patient with BMP which is normal.  CBC is normal, with normal white blood cell count, and CRP is normal.  There is no drainage from the right lateral malleolus area, where there is surgical incision site.  There is no surrounding redness, and this is the area of the fluid collection, which likely represents hematoma.  Patient's pain is now well-controlled, and given the lack of other external findings, with no tenderness over the peroneal tendon area, I feel patient can be  referred back to podiatry for further follow-up and routine cares.  She is instructed to return if fevers, redness, drainage, or other concerns develop.  I did consider hospitalization, and transfer for further evaluation, however given the good response to analgesic medications, will discharge home and f/up in clinic.          I have reviewed the nursing notes.    I have reviewed the findings, diagnosis, plan and need for follow up with the patient.       Critical Care time:  none      NEW PRESCRIPTIONS STARTED AT TODAY'S ER VISIT  Discharge Medication List as of 5/25/2024  3:06 PM        START taking these medications    Details   oxyCODONE (ROXICODONE) 5 MG tablet Take 1 tablet (5 mg) by mouth every 6 hours as needed for severe pain, Disp-10 tablet, R-0, E-Prescribe             Final diagnoses:   Right foot pain   Post-op pain       5/25/2024   [facility] EMERGENCY DEPT       Trevor Devine MD  05/25/24 5473

## 2024-12-28 ENCOUNTER — HEALTH MAINTENANCE LETTER (OUTPATIENT)
Age: 44
End: 2024-12-28

## 2025-08-31 ENCOUNTER — APPOINTMENT (OUTPATIENT)
Dept: CT IMAGING | Facility: CLINIC | Age: 45
End: 2025-08-31
Attending: FAMILY MEDICINE
Payer: COMMERCIAL

## 2025-08-31 ENCOUNTER — APPOINTMENT (OUTPATIENT)
Dept: MRI IMAGING | Facility: CLINIC | Age: 45
End: 2025-08-31
Attending: FAMILY MEDICINE
Payer: COMMERCIAL

## 2025-08-31 ENCOUNTER — HOSPITAL ENCOUNTER (EMERGENCY)
Facility: CLINIC | Age: 45
Discharge: HOME | End: 2025-08-31
Attending: FAMILY MEDICINE | Admitting: FAMILY MEDICINE
Payer: COMMERCIAL

## 2025-08-31 VITALS
HEART RATE: 102 BPM | DIASTOLIC BLOOD PRESSURE: 87 MMHG | SYSTOLIC BLOOD PRESSURE: 133 MMHG | OXYGEN SATURATION: 99 % | TEMPERATURE: 97.8 F | WEIGHT: 190 LBS | BODY MASS INDEX: 30.53 KG/M2 | RESPIRATION RATE: 16 BRPM | HEIGHT: 66 IN

## 2025-08-31 DIAGNOSIS — H81.399 PERIPHERAL VERTIGO, UNSPECIFIED LATERALITY: Primary | ICD-10-CM

## 2025-08-31 DIAGNOSIS — R10.32 LLQ ABDOMINAL PAIN: ICD-10-CM

## 2025-08-31 LAB
ANION GAP SERPL CALCULATED.3IONS-SCNC: 17 MMOL/L (ref 7–15)
BASOPHILS # BLD AUTO: 0.06 10E3/UL (ref 0–0.2)
BASOPHILS NFR BLD AUTO: 0.8 %
BUN SERPL-MCNC: 7.7 MG/DL (ref 6–20)
CALCIUM SERPL-MCNC: 8.9 MG/DL (ref 8.8–10.4)
CHLORIDE SERPL-SCNC: 97 MMOL/L (ref 98–107)
CREAT SERPL-MCNC: 0.6 MG/DL (ref 0.51–0.95)
CRP SERPL-MCNC: 27.64 MG/L
EGFRCR SERPLBLD CKD-EPI 2021: >90 ML/MIN/1.73M2
EOSINOPHIL # BLD AUTO: 0.31 10E3/UL (ref 0–0.7)
EOSINOPHIL NFR BLD AUTO: 3.9 %
ERYTHROCYTE [DISTWIDTH] IN BLOOD BY AUTOMATED COUNT: 11.9 % (ref 10–15)
ETHANOL SERPL-MCNC: 0.02 G/DL
GLUCOSE SERPL-MCNC: 84 MG/DL (ref 70–99)
HCO3 SERPL-SCNC: 24 MMOL/L (ref 22–29)
HCT VFR BLD AUTO: 31.9 % (ref 35–47)
HGB BLD-MCNC: 10.8 G/DL (ref 11.7–15.7)
IMM GRANULOCYTES # BLD: 0.04 10E3/UL
IMM GRANULOCYTES NFR BLD: 0.5 %
LYMPHOCYTES # BLD AUTO: 1.88 10E3/UL (ref 0.8–5.3)
LYMPHOCYTES NFR BLD AUTO: 23.9 %
MCH RBC QN AUTO: 32.6 PG (ref 26.5–33)
MCHC RBC AUTO-ENTMCNC: 33.9 G/DL (ref 31.5–36.5)
MCV RBC AUTO: 96.4 FL (ref 78–100)
MONOCYTES # BLD AUTO: 0.63 10E3/UL (ref 0–1.3)
MONOCYTES NFR BLD AUTO: 8 %
NEUTROPHILS # BLD AUTO: 4.94 10E3/UL (ref 1.6–8.3)
NEUTROPHILS NFR BLD AUTO: 62.9 %
NRBC # BLD AUTO: <0.03 10E3/UL
NRBC BLD AUTO-RTO: 0 /100
PLATELET # BLD AUTO: 299 10E3/UL (ref 150–450)
POTASSIUM SERPL-SCNC: 3.5 MMOL/L (ref 3.4–5.3)
RBC # BLD AUTO: 3.31 10E6/UL (ref 3.8–5.2)
SODIUM SERPL-SCNC: 138 MMOL/L (ref 135–145)
TSH SERPL DL<=0.005 MIU/L-ACNC: 2.47 UIU/ML (ref 0.3–4.2)
WBC # BLD AUTO: 7.86 10E3/UL (ref 4–11)

## 2025-08-31 PROCEDURE — 250N000013 HC RX MED GY IP 250 OP 250 PS 637: Performed by: FAMILY MEDICINE

## 2025-08-31 PROCEDURE — 70450 CT HEAD/BRAIN W/O DYE: CPT

## 2025-08-31 PROCEDURE — 255N000002 HC RX 255 OP 636: Performed by: FAMILY MEDICINE

## 2025-08-31 PROCEDURE — 250N000011 HC RX IP 250 OP 636: Performed by: FAMILY MEDICINE

## 2025-08-31 PROCEDURE — 99285 EMERGENCY DEPT VISIT HI MDM: CPT | Mod: 25 | Performed by: FAMILY MEDICINE

## 2025-08-31 PROCEDURE — 70551 MRI BRAIN STEM W/O DYE: CPT

## 2025-08-31 PROCEDURE — 96374 THER/PROPH/DIAG INJ IV PUSH: CPT | Mod: 59

## 2025-08-31 PROCEDURE — 36415 COLL VENOUS BLD VENIPUNCTURE: CPT | Performed by: FAMILY MEDICINE

## 2025-08-31 PROCEDURE — 82077 ASSAY SPEC XCP UR&BREATH IA: CPT | Performed by: FAMILY MEDICINE

## 2025-08-31 PROCEDURE — 93005 ELECTROCARDIOGRAM TRACING: CPT

## 2025-08-31 PROCEDURE — 96375 TX/PRO/DX INJ NEW DRUG ADDON: CPT

## 2025-08-31 PROCEDURE — 85004 AUTOMATED DIFF WBC COUNT: CPT | Performed by: FAMILY MEDICINE

## 2025-08-31 PROCEDURE — 258N000003 HC RX IP 258 OP 636: Performed by: FAMILY MEDICINE

## 2025-08-31 PROCEDURE — 84443 ASSAY THYROID STIM HORMONE: CPT | Performed by: FAMILY MEDICINE

## 2025-08-31 PROCEDURE — 70496 CT ANGIOGRAPHY HEAD: CPT

## 2025-08-31 PROCEDURE — 86140 C-REACTIVE PROTEIN: CPT | Performed by: FAMILY MEDICINE

## 2025-08-31 PROCEDURE — 250N000009 HC RX 250: Performed by: FAMILY MEDICINE

## 2025-08-31 PROCEDURE — 82947 ASSAY GLUCOSE BLOOD QUANT: CPT | Performed by: FAMILY MEDICINE

## 2025-08-31 RX ORDER — MECLIZINE HYDROCHLORIDE 25 MG/1
25 TABLET ORAL ONCE
Status: COMPLETED | OUTPATIENT
Start: 2025-08-31 | End: 2025-08-31

## 2025-08-31 RX ORDER — DIPHENHYDRAMINE HYDROCHLORIDE 50 MG/ML
12.5 INJECTION INTRAMUSCULAR; INTRAVENOUS ONCE
Status: COMPLETED | OUTPATIENT
Start: 2025-08-31 | End: 2025-08-31

## 2025-08-31 RX ORDER — ONDANSETRON 4 MG/1
4-8 TABLET, ORALLY DISINTEGRATING ORAL EVERY 8 HOURS PRN
Qty: 12 TABLET | Refills: 0 | Status: SHIPPED | OUTPATIENT
Start: 2025-08-31

## 2025-08-31 RX ORDER — MECLIZINE HYDROCHLORIDE 25 MG/1
25 TABLET ORAL 3 TIMES DAILY PRN
Qty: 12 TABLET | Refills: 0 | Status: SHIPPED | OUTPATIENT
Start: 2025-08-31

## 2025-08-31 RX ADMIN — DIPHENHYDRAMINE HYDROCHLORIDE 12.5 MG: 50 INJECTION INTRAMUSCULAR; INTRAVENOUS at 16:07

## 2025-08-31 RX ADMIN — MECLIZINE HYDROCHLORIDE 25 MG: 25 TABLET ORAL at 17:57

## 2025-08-31 RX ADMIN — PROMETHAZINE HYDROCHLORIDE 12.5 MG: 25 INJECTION, SOLUTION INTRAMUSCULAR; INTRAVENOUS at 16:12

## 2025-08-31 RX ADMIN — IOHEXOL 71 ML: 350 INJECTION, SOLUTION INTRAVENOUS at 14:41

## 2025-08-31 RX ADMIN — SODIUM CHLORIDE 100 ML: 9 INJECTION, SOLUTION INTRAVENOUS at 14:42

## 2025-08-31 ASSESSMENT — COLUMBIA-SUICIDE SEVERITY RATING SCALE - C-SSRS
2. HAVE YOU ACTUALLY HAD ANY THOUGHTS OF KILLING YOURSELF IN THE PAST MONTH?: NO
1. IN THE PAST MONTH, HAVE YOU WISHED YOU WERE DEAD OR WISHED YOU COULD GO TO SLEEP AND NOT WAKE UP?: NO
6. HAVE YOU EVER DONE ANYTHING, STARTED TO DO ANYTHING, OR PREPARED TO DO ANYTHING TO END YOUR LIFE?: NO

## 2025-08-31 ASSESSMENT — ACTIVITIES OF DAILY LIVING (ADL)
ADLS_ACUITY_SCORE: 41

## 2025-09-02 LAB
ATRIAL RATE - MUSE: 96 BPM
DIASTOLIC BLOOD PRESSURE - MUSE: NORMAL MMHG
INTERPRETATION ECG - MUSE: NORMAL
P AXIS - MUSE: 36 DEGREES
PR INTERVAL - MUSE: 150 MS
QRS DURATION - MUSE: 102 MS
QT - MUSE: 388 MS
QTC - MUSE: 490 MS
R AXIS - MUSE: 18 DEGREES
SYSTOLIC BLOOD PRESSURE - MUSE: NORMAL MMHG
T AXIS - MUSE: 32 DEGREES
VENTRICULAR RATE- MUSE: 96 BPM

## (undated) DEVICE — GLOVE BIOGEL PI MICRO SZ 6.5 48565

## (undated) DEVICE — DRAPE POUCH INSTRUMENT 3 POCKET 1018L

## (undated) DEVICE — SU MONOCRYL 4-0 PS-2 18" UND Y496G

## (undated) DEVICE — ESU HOOK TIP 5MM CONMED

## (undated) DEVICE — SYSTEM LAPAROVUE VISIBILITY LAPVUE10

## (undated) DEVICE — ENDO TROCAR SHIELDED BLADED KII Z-THRD 05X100MM CTB03

## (undated) DEVICE — GOWN XLG DISP 9545

## (undated) DEVICE — STOCKING SLEEVE COMPRESSION CALF MED

## (undated) DEVICE — SOL NACL 0.9% IRRIG 3000ML BAG 07972-08

## (undated) DEVICE — SU VICRYL 0 TIE 54" UND J287G

## (undated) DEVICE — Device

## (undated) DEVICE — ESU SUCTION/IRRIGATION SYSTEM PISTOL GRIP

## (undated) DEVICE — GLOVE BIOGEL PI ULTRATOUCH G SZ 7.5 42175

## (undated) DEVICE — DRAPE TIBURON GENERAL ENDOSCOPY 9458

## (undated) DEVICE — ESU HOLSTER PLASTIC DISP E2400

## (undated) DEVICE — SOL ADH LIQUID BENZOIN SWAB 0.6ML C1544

## (undated) DEVICE — ESU ENDO SCISSORS 5MM CVD 5DCS

## (undated) DEVICE — STPL POWERED ECHELON 45MM PSEE45A

## (undated) DEVICE — ENDO TROCAR FIRST ENTRY KII FIOS Z-THRD 05X100MM CTF03

## (undated) DEVICE — DRSG STERI STRIP 1/2X4" R1547

## (undated) DEVICE — STPL RELOAD REG TISSUE ECHELON 45 X 3.6MM BLUE GST45B

## (undated) DEVICE — GLOVE BIOGEL PI MICRO INDICATOR UNDERGLOVE SZ 6.5 48965

## (undated) DEVICE — DRSG TEGADERM 2 3/8X2 3/4" 1624W

## (undated) DEVICE — PREP CHLORAPREP 26ML TINTED ORANGE  260815

## (undated) DEVICE — SU VICRYL 3-0 SH 27" J316H

## (undated) DEVICE — ENDO TROCAR FIRST ENTRY KII FIOS ADV FIX 12X100MM CFF73

## (undated) DEVICE — ENDO POUCH 10MM POUCH

## (undated) DEVICE — DEVICE SUTURE PASSER 14GA WECK EFX EFXSP2

## (undated) RX ORDER — DEXAMETHASONE SODIUM PHOSPHATE 4 MG/ML
INJECTION, SOLUTION INTRA-ARTICULAR; INTRALESIONAL; INTRAMUSCULAR; INTRAVENOUS; SOFT TISSUE
Status: DISPENSED
Start: 2023-02-22

## (undated) RX ORDER — GLYCOPYRROLATE 0.2 MG/ML
INJECTION, SOLUTION INTRAMUSCULAR; INTRAVENOUS
Status: DISPENSED
Start: 2023-02-22

## (undated) RX ORDER — KETOROLAC TROMETHAMINE 30 MG/ML
INJECTION, SOLUTION INTRAMUSCULAR; INTRAVENOUS
Status: DISPENSED
Start: 2023-02-22

## (undated) RX ORDER — HYDROXYZINE HYDROCHLORIDE 50 MG/1
TABLET, FILM COATED ORAL
Status: DISPENSED
Start: 2023-02-22

## (undated) RX ORDER — LIDOCAINE HYDROCHLORIDE 10 MG/ML
INJECTION, SOLUTION EPIDURAL; INFILTRATION; INTRACAUDAL; PERINEURAL
Status: DISPENSED
Start: 2023-02-22

## (undated) RX ORDER — CEFAZOLIN SODIUM 1 G/3ML
INJECTION, POWDER, FOR SOLUTION INTRAMUSCULAR; INTRAVENOUS
Status: DISPENSED
Start: 2023-02-22

## (undated) RX ORDER — ONDANSETRON 2 MG/ML
INJECTION INTRAMUSCULAR; INTRAVENOUS
Status: DISPENSED
Start: 2023-02-22

## (undated) RX ORDER — FENTANYL CITRATE 50 UG/ML
INJECTION, SOLUTION INTRAMUSCULAR; INTRAVENOUS
Status: DISPENSED
Start: 2023-02-22

## (undated) RX ORDER — PROPOFOL 10 MG/ML
INJECTION, EMULSION INTRAVENOUS
Status: DISPENSED
Start: 2023-02-22

## (undated) RX ORDER — OXYCODONE HYDROCHLORIDE 5 MG/1
TABLET ORAL
Status: DISPENSED
Start: 2023-02-22